# Patient Record
Sex: FEMALE | Race: WHITE | Employment: FULL TIME | URBAN - METROPOLITAN AREA
[De-identification: names, ages, dates, MRNs, and addresses within clinical notes are randomized per-mention and may not be internally consistent; named-entity substitution may affect disease eponyms.]

---

## 2017-01-12 ENCOUNTER — TELEPHONE (OUTPATIENT)
Dept: INTERNAL MEDICINE | Facility: CLINIC | Age: 52
End: 2017-01-12

## 2017-01-12 DIAGNOSIS — R05.9 COUGH: Primary | ICD-10-CM

## 2017-01-12 RX ORDER — AZITHROMYCIN 250 MG/1
TABLET, FILM COATED ORAL
Qty: 6 TABLET | Refills: 0 | Status: SHIPPED | OUTPATIENT
Start: 2017-01-12 | End: 2017-01-13

## 2017-01-12 NOTE — TELEPHONE ENCOUNTER
Pt calls, reports hx of sepsis r/t pneumonia recently. Started having cough a couple days ago that is constant and causes some gagging. Denies productive cough and fever. Reports mild wheezing and hearing rattling when she lays down.    Please advise, thanks.

## 2017-01-12 NOTE — TELEPHONE ENCOUNTER
I can see her anytime tomorrow.(I plan on being here tomorrow)    I will also send in a prescription for possible community acquired pneumonia if the symptoms feel the same as previous.

## 2017-01-13 ENCOUNTER — OFFICE VISIT (OUTPATIENT)
Dept: INTERNAL MEDICINE | Facility: CLINIC | Age: 52
End: 2017-01-13
Payer: COMMERCIAL

## 2017-01-13 ENCOUNTER — RADIANT APPOINTMENT (OUTPATIENT)
Dept: GENERAL RADIOLOGY | Facility: CLINIC | Age: 52
End: 2017-01-13
Attending: INTERNAL MEDICINE
Payer: COMMERCIAL

## 2017-01-13 VITALS
HEIGHT: 64 IN | OXYGEN SATURATION: 98 % | HEART RATE: 68 BPM | TEMPERATURE: 100.6 F | WEIGHT: 194 LBS | DIASTOLIC BLOOD PRESSURE: 82 MMHG | BODY MASS INDEX: 33.12 KG/M2 | SYSTOLIC BLOOD PRESSURE: 126 MMHG

## 2017-01-13 DIAGNOSIS — J45.20 MILD INTERMITTENT ASTHMA WITHOUT COMPLICATION: ICD-10-CM

## 2017-01-13 DIAGNOSIS — J20.8 ACUTE BRONCHITIS DUE TO OTHER SPECIFIED ORGANISMS: Primary | ICD-10-CM

## 2017-01-13 DIAGNOSIS — C19 COLORECTAL CARCINOMA (H): ICD-10-CM

## 2017-01-13 DIAGNOSIS — J20.8 ACUTE BRONCHITIS DUE TO OTHER SPECIFIED ORGANISMS: ICD-10-CM

## 2017-01-13 PROCEDURE — 99214 OFFICE O/P EST MOD 30 MIN: CPT | Performed by: INTERNAL MEDICINE

## 2017-01-13 PROCEDURE — 71020 XR CHEST 2 VW: CPT

## 2017-01-13 RX ORDER — LEVOFLOXACIN 500 MG/1
500 TABLET, FILM COATED ORAL DAILY
Qty: 7 TABLET | Refills: 0 | Status: SHIPPED | OUTPATIENT
Start: 2017-01-13 | End: 2018-05-24

## 2017-01-13 NOTE — MR AVS SNAPSHOT
After Visit Summary   1/13/2017    Liss Berg    MRN: [id number]           Patient Information     Date Of Birth          1965        Visit Information        Provider Department      1/13/2017 3:00 PM Alina Mahmood MD [facility]        Today's Diagnoses     Acute bronchitis due to other specified organisms    -  1     Mild intermittent asthma without complication         Colorectal carcinoma (H)            Follow-ups after your visit        Your next 10 appointments already scheduled     Mar 10, 2017 10:00 AM   CT CHEST/ABDOMEN/PELVIS W CONTRAST with [id number]   [facility] ([facility])    [address]  [facility] [id number] [phone]           Please bring any scans or X-rays taken at other hospitals, if similar tests were done. Also bring a list of your medicines, including vitamins, minerals and over-the-counter drugs. It is safest to leave personal items at home.  Be sure to tell your doctor:   If you have any allergies.   If there s any chance you are pregnant.   If you are breastfeeding.   If you have any special needs.  You may have contrast for this exam. To prepare:   Do not eat or drink for 2 hours before your exam. If you need to take medicine, you may take it with small sips of water. (We may ask you to take liquid medicine as well.)   The day before your exam, drink extra fluids at least six 8-ounce glasses (unless your doctor tells you to restrict your fluids).  Patients over 70 or patients with diabetes or kidney problems:   If you haven t had a blood test (creatinine test) within the last 30 days, go to your clinic or Diagnostic Imaging Department for this test.  If you have diabetes:   If your kidney function is normal, continue taking your metformin (Avandamet, Glucophage, Glucovance, Metaglip) on the day of your exam.   If your kidney function is abnormal, wait 48 hours before  "restarting this medicine.  You will have oral contrast for this exam:   You will drink the contrast at home. Get this from your clinic or Diagnostic Imaging Department. Please follow the directions given.  Please wear loose clothing, such as a sweat suit or jogging clothes. Avoid snaps, zippers and other metal. We may ask you to undress and put on a hospital gown.  If you have any questions, please call the Imaging Department where you will have your exam.              Future tests that were ordered for you today     Open Future Orders        Priority Expected Expires Ordered    XR Chest 2 Views Routine 1/13/2017 1/13/2018 1/13/2017            Who to contact     If you have questions or need follow up information about today's clinic visit or your schedule please contact [facility] directly at [phone].  Normal or non-critical lab and imaging results will be communicated to you by [email or web address], letter or phone within 4 business days after the clinic has received the results. If you do not hear from us within 7 days, please contact the clinic through Jobs2Webhart or phone. If you have a critical or abnormal lab result, we will notify you by phone as soon as possible.  Submit refill requests through Flatora or call your pharmacy and they will forward the refill request to us. Please allow 3 business days for your refill to be completed.          Additional Information About Your Visit        Flatora Information     Flatora lets you send messages to your doctor, view your test results, renew your prescriptions, schedule appointments and more. To sign up, go to [email or web address] . Click on \"Log in\" on the left side of the screen, which will take you to the Welcome page. Then click on \"Sign up Now\" on the right side of the page.     You will be asked to enter the access code listed below, as well as some personal information. Please follow the directions to create your username and password.     Your " "access code is: [email or web address]  Expires: 2017  3:56 PM     Your access code will  in 90 days. If you need help or a new code, please call your [facility] or [phone].        Care EveryWhere ID     This is your Care EveryWhere ID. This could be used by other organizations to access your [place] medical records  [id number]        Your Vitals Were     Pulse Temperature Height BMI (Body Mass Index) Pulse Oximetry       68 100.6  F (38.1  C) (Oral) 5' 4\" (1.626 m) 33.28 kg/m2 98%        Blood Pressure from Last 3 Encounters:   17 126/82   16 170/85   07/15/16 162/76    Weight from Last 3 Encounters:   17 194 lb (87.998 kg)   16 205 lb (92.987 kg)   07/15/16 208 lb 12.8 oz (94.711 kg)                 Today's Medication Changes          These changes are accurate as of: 17  3:56 PM.  If you have any questions, ask your nurse or doctor.               Start taking these medicines.        Dose/Directions    levofloxacin 500 MG tablet   Commonly known as:  LEVAQUIN   Used for:  Acute bronchitis due to other specified organisms   Started by:  Alina Mahmood MD        Dose:  500 mg   Take 1 tablet (500 mg) by mouth daily   Quantity:  7 tablet   Refills:  0            Where to get your medicines      These medications were sent to [id number] - [address] [place] [address] [address] [id number]     Phone:  [phone]    - levofloxacin 500 MG tablet             Primary Care Provider Office Phone # Fax #    Alina Mahmood [phone]       [facility] [address] [facility] [address]        Thank you!     Thank you for choosing [facility]  for your care. Our goal is always to provide you with excellent care. Hearing back from our patients is one way we can continue to improve our services. Please take a few minutes to complete the written survey that you may receive in the mail " after your visit with us. Thank you!             Your Updated Medication List - Protect others around you: Learn how to safely use, store and throw away your medicines at www.disposemymeds.org.          This list is accurate as of: 1/13/17  3:56 PM.  Always use your most recent med list.                   Brand Name Dispense Instructions for use    albuterol 108 (90 BASE) MCG/ACT Inhaler    PROAIR HFA/PROVENTIL HFA/VENTOLIN HFA    1 Inhaler    Inhale 2 puffs into the lungs every 6 hours as needed for shortness of breath / dyspnea or wheezing       ferrous sulfate 325 (65 FE) MG tablet    IRON    90 tablet    Take 1 tablet (325 mg) by mouth daily (with breakfast)       levofloxacin 500 MG tablet    LEVAQUIN    7 tablet    Take 1 tablet (500 mg) by mouth daily       losartan 50 MG tablet    COZAAR    90 tablet    Take 1 tablet (50 mg) by mouth daily       metoprolol 100 MG tablet    LOPRESSOR    180 tablet    Take 1 tablet (100 mg) by mouth 2 times daily       traZODone 50 MG tablet    DESYREL    30 tablet    Take 0.5-2 tablets ( mg) by mouth nightly as needed for sleep

## 2017-01-13 NOTE — NURSING NOTE
"Chief Complaint   Patient presents with     Cough     pt states her port has been causing her a lot of pain onset x 4 days. pt is now febrile. cough is non preod onset x 3 days.     initial /82 mmHg  Pulse 68  Temp(Src) 100.6  F (38.1  C) (Oral)  Ht 5' 4\" (1.626 m)  Wt 194 lb (87.998 kg)  BMI 33.28 kg/m2  SpO2 98% Estimated body mass index is 33.28 kg/(m^2) as calculated from the following:    Height as of this encounter: 5' 4\" (1.626 m).    Weight as of this encounter: 194 lb (87.998 kg)..  bp completed using cuff size large  CRIS ATWOOD LPN  "

## 2017-01-13 NOTE — PROGRESS NOTES
".  SUBJECTIVE:                                                    Liss Berg is a 52 year old female who presents to clinic today for the following health issues:    She was feeling her usual self until 4-5 days ago.  She reports the cough started to return.  She is not coughing up any sputum.   She hears a whistle when she is breathing.  She has had some shortness of breath.   No sore throat, rhinorrhea, or sore throat.    No leg swelling.  No chest pain.     She was discharged 12/20 for pneumonia and sepsis. She had been on azithromycin and ceftin.    History of asthma.     Colon cancer.  Last chemo treatment was last month.   She will have a CT in March.  She sees NP in February.      CT scan of the Chest from OSH revealed:   IMPRESSION:   1. No evidence of pulmonary embolus.  2. Masslike consolidation in the left perihilar region measuring up to 3.7 cm. Differential diagnosis includes infection; however, given history, neoplasm is also a consideration. A smaller adjacent area of consolidation is also present. Pulmonary consultation recommended. At minimum, a short interval follow-up chest CT is recommended.       Problem list and histories reviewed & adjusted, as indicated.      ROS:  C: NEGATIVE for fever, chills, change in weight  E/M: NEGATIVE for ear, mouth and throat problems  R: POS cough and SOB  CV: NEGATIVE for chest pain, palpitations or peripheral edema    OBJECTIVE:                                                    /82 mmHg  Pulse 68  Temp(Src) 100.6  F (38.1  C) (Oral)  Ht 5' 4\" (1.626 m)  Wt 194 lb (87.998 kg)  BMI 33.28 kg/m2  SpO2 98%  Body mass index is 33.28 kg/(m^2).  GENERAL: healthy, alert and no distress  RESP: lungs clear to auscultation - no rales, rhonchi or wheezes  CV: regular rate and rhythm; 2/6 LUCAS     ASSESSMENT/PLAN:                                                        (J20.8) Acute bronchitis due to other specified organisms  (primary encounter " diagnosis)  Comment:   Plan: levofloxacin (LEVAQUIN) 500 MG tablet, XR Chest        2 Views            (J45.20) Mild intermittent asthma without complication  Comment:  Plan: XR Chest 2 Views           (C19) Colorectal carcinoma (H)  Comment:  Plan: follow up with oncology  Pt to also discuss obtaining a repeat CT scan of the chest mass        Alina Mahmood MD  [facility]

## 2017-01-13 NOTE — Clinical Note
[facility] [address] [place] [address]  [phone]      To whom it may concern:    Liss Berg is a patient under my care.  She was seen on 1/13/17 for a medical illness.  Please excuse her from work on 1/13/17- 1/15/17.  She may return to work on 1/16/17.  Please call with any questions.        Alina Mahmood MD

## 2017-02-03 ENCOUNTER — TRANSFERRED RECORDS (OUTPATIENT)
Dept: HEALTH INFORMATION MANAGEMENT | Facility: CLINIC | Age: 52
End: 2017-02-03

## 2017-03-10 ENCOUNTER — HOSPITAL ENCOUNTER (OUTPATIENT)
Dept: CT IMAGING | Facility: CLINIC | Age: 52
Discharge: HOME | End: 2017-03-10
Attending: NURSE PRACTITIONER | Admitting: NURSE PRACTITIONER
Payer: COMMERCIAL

## 2017-03-10 DIAGNOSIS — C18.4 MALIGNANT NEOPLASM OF TRANSVERSE COLON (H): ICD-10-CM

## 2017-03-10 PROCEDURE — 71260 CT THORAX DX C+: CPT

## 2017-03-10 PROCEDURE — 74177 CT ABD & PELVIS W/CONTRAST: CPT

## 2017-03-10 PROCEDURE — 25000128 H RX IP 250 OP 636: Performed by: RADIOLOGY

## 2017-03-10 PROCEDURE — 25500064 ZZH RX 255 OP 636: Performed by: RADIOLOGY

## 2017-03-10 RX ORDER — IOPAMIDOL 755 MG/ML
500 INJECTION, SOLUTION INTRAVASCULAR ONCE
Status: COMPLETED | OUTPATIENT
Start: 2017-03-10 | End: 2017-03-10

## 2017-03-10 RX ADMIN — IOPAMIDOL 95 ML: 755 INJECTION, SOLUTION INTRAVENOUS at 10:03

## 2017-03-10 RX ADMIN — SODIUM CHLORIDE 65 ML: 9 INJECTION, SOLUTION INTRAVENOUS at 10:03

## 2017-03-17 ENCOUNTER — TRANSFERRED RECORDS (OUTPATIENT)
Dept: SURGERY | Facility: CLINIC | Age: 52
End: 2017-03-17

## 2017-04-13 ENCOUNTER — TRANSFERRED RECORDS (OUTPATIENT)
Dept: HEALTH INFORMATION MANAGEMENT | Facility: CLINIC | Age: 52
End: 2017-04-13

## 2017-06-16 ENCOUNTER — TRANSFERRED RECORDS (OUTPATIENT)
Dept: SURGERY | Facility: CLINIC | Age: 52
End: 2017-06-16

## 2017-06-16 ENCOUNTER — TRANSFERRED RECORDS (OUTPATIENT)
Dept: HEALTH INFORMATION MANAGEMENT | Facility: CLINIC | Age: 52
End: 2017-06-16

## 2017-06-17 ENCOUNTER — HEALTH MAINTENANCE LETTER (OUTPATIENT)
Age: 52
End: 2017-06-17

## 2017-06-21 ENCOUNTER — TELEPHONE (OUTPATIENT)
Dept: INTERNAL MEDICINE | Facility: CLINIC | Age: 52
End: 2017-06-21

## 2017-06-21 NOTE — TELEPHONE ENCOUNTER
Panel Management Review      Patient has the following on her problem list:     Asthma review   No flowsheet data found.   1. Is Asthma diagnosis on the Problem List? Yes    2. Is Asthma listed on Health Maintenance? No   3. Patient is due for:  none    Hypertension   Last three blood pressure readings:  BP Readings from Last 3 Encounters:   01/13/17 126/82   08/05/16 170/85   07/15/16 162/76     Blood pressure: Passed    HTN Guidelines:  Age 18-59 BP range:  Less than 140/90  Age 60-85 with Diabetes:  Less than 140/90  Age 60-85 without Diabetes:  less than 150/90      Composite cancer screening  Chart review shows that this patient is due/due soon for the following Pap Smear and Mammogram  Summary:    Patient is due/failing the following:   MAMMOGRAM and PAP    Action needed:   Patient needs office visit for Pap. and Patient needs referral/order: Mammgram    Type of outreach:    Sent letter.    Questions for provider review:    None                                                                                                                                    DAYNA ROJAS CMA       Chart routed to none .

## 2017-07-20 ENCOUNTER — TELEPHONE (OUTPATIENT)
Dept: INTERNAL MEDICINE | Facility: CLINIC | Age: 52
End: 2017-07-20

## 2017-07-20 NOTE — TELEPHONE ENCOUNTER
Pt called. Stated she has 2 issues going on and not sure if they are related. Stated about 15-20min ago she developed blurriness in her L eye, and its been constant. No eye pain, no drainage, no injury. Pt has not checked BP, but can usually tell when BP is high, and pt does not feel it is.     Also pt has not had a period for over 2yrs, and for the past 4wks she has been spotting and having abdominal cramping off and on.       Relayed to pt that she should be seen today for eye issue, and when this is looked at pt should come in for appt regarding vag bleeding. Pt understood. Transferred to scheduling

## 2017-08-01 ENCOUNTER — TELEPHONE (OUTPATIENT)
Dept: INTERNAL MEDICINE | Facility: CLINIC | Age: 52
End: 2017-08-01

## 2017-08-01 NOTE — TELEPHONE ENCOUNTER
8/1/2017    Call Regarding Preventive Health Screening Mammogram    Attempt 1    Message     Comments: vm is not set up yet      Outreach   Seema Martinez

## 2017-08-14 DIAGNOSIS — I10 BENIGN ESSENTIAL HYPERTENSION: ICD-10-CM

## 2017-08-14 RX ORDER — LOSARTAN POTASSIUM 50 MG/1
50 TABLET ORAL DAILY
Qty: 90 TABLET | Refills: 0 | Status: SHIPPED | OUTPATIENT
Start: 2017-08-14 | End: 2018-05-24

## 2017-08-14 NOTE — TELEPHONE ENCOUNTER
Received refill request for:  Cozaar  Last OV was: 8/5/2016 with NATHANAEL Portillo  Labs/EKG: last BMP 8/22/2016  F/U scheduled: overdue orders in Epic (refill letter sent)  New script sent to: [facility]

## 2017-08-14 NOTE — LETTER
August 14, 2017       TO: Liss Berg  [id number]  [facility] [address]       Dear Liss Berg,    You have requested a medication refill for Cozaar and our records indicate that you have not scheduled your annual office visit.  We will refill your medication for 3 months, which will allow you enough time to be seen by one of our providers.    Please call our clinic at [phone] to schedule your appointment as soon as possible.    Thank you,    [facility]

## 2017-09-08 ENCOUNTER — TRANSFERRED RECORDS (OUTPATIENT)
Dept: SURGERY | Facility: CLINIC | Age: 52
End: 2017-09-08

## 2017-09-08 ENCOUNTER — TRANSFERRED RECORDS (OUTPATIENT)
Dept: HEALTH INFORMATION MANAGEMENT | Facility: CLINIC | Age: 52
End: 2017-09-08

## 2017-09-25 DIAGNOSIS — I10 BENIGN ESSENTIAL HYPERTENSION: ICD-10-CM

## 2017-09-25 RX ORDER — METOPROLOL TARTRATE 100 MG
100 TABLET ORAL 2 TIMES DAILY
Qty: 180 TABLET | Refills: 0 | Status: SHIPPED | OUTPATIENT
Start: 2017-09-25 | End: 2018-05-24

## 2017-09-25 NOTE — LETTER
September 25, 2017       TO: Liss Berg  [address]  [facility] [address]       Dear Liss eBrg,    You have requested a medication refill for Metoprolol Tartrate and our records indicate that you have not scheduled your annual office visit with Dr. Leyva.  We will refill your medication for 3 months, which will allow you enough time to be seen by one of our providers.    Please call our clinic at [phone] to schedule your appointment as soon as possible.    Thank you,    [facility]

## 2017-09-25 NOTE — TELEPHONE ENCOUNTER
Received refill request for:  Metoprolol Tartrate  Last OV was: 8/5/2016 with Sophiea  Labs/EKG: n/a  F/U scheduled: orders in [facility] for 9/2017 - Not yet scheduled - refill letter sent  New script sent to: [facility]

## 2017-12-08 ENCOUNTER — HOSPITAL ENCOUNTER (OUTPATIENT)
Dept: CT IMAGING | Facility: CLINIC | Age: 52
Discharge: HOME | End: 2017-12-08
Attending: NURSE PRACTITIONER | Admitting: NURSE PRACTITIONER
Payer: COMMERCIAL

## 2017-12-08 DIAGNOSIS — C18.4 MALIGNANT NEOPLASM OF TRANSVERSE COLON (H): ICD-10-CM

## 2017-12-08 PROCEDURE — 25000128 H RX IP 250 OP 636: Performed by: NURSE PRACTITIONER

## 2017-12-08 PROCEDURE — 25000128 H RX IP 250 OP 636

## 2017-12-08 PROCEDURE — 71260 CT THORAX DX C+: CPT

## 2017-12-08 PROCEDURE — 74177 CT ABD & PELVIS W/CONTRAST: CPT

## 2017-12-08 RX ORDER — HEPARIN SODIUM (PORCINE) LOCK FLUSH IV SOLN 100 UNIT/ML 100 UNIT/ML
5 SOLUTION INTRAVENOUS ONCE
Status: COMPLETED | OUTPATIENT
Start: 2017-12-08 | End: 2017-12-08

## 2017-12-08 RX ORDER — IOPAMIDOL 755 MG/ML
500 INJECTION, SOLUTION INTRAVASCULAR ONCE
Status: COMPLETED | OUTPATIENT
Start: 2017-12-08 | End: 2017-12-08

## 2017-12-08 RX ORDER — HEPARIN SODIUM (PORCINE) LOCK FLUSH IV SOLN 100 UNIT/ML 100 UNIT/ML
SOLUTION INTRAVENOUS
Status: COMPLETED
Start: 2017-12-08 | End: 2017-12-08

## 2017-12-08 RX ADMIN — SODIUM CHLORIDE 65 ML: 9 INJECTION, SOLUTION INTRAVENOUS at 14:30

## 2017-12-08 RX ADMIN — HEPARIN SODIUM (PORCINE) LOCK FLUSH IV SOLN 100 UNIT/ML 5 ML: 100 SOLUTION at 14:47

## 2017-12-08 RX ADMIN — IOPAMIDOL 100 ML: 755 INJECTION, SOLUTION INTRAVENOUS at 14:30

## 2017-12-08 RX ADMIN — SODIUM CHLORIDE, PRESERVATIVE FREE 5 ML: 5 INJECTION INTRAVENOUS at 14:47

## 2017-12-19 ENCOUNTER — TRANSFERRED RECORDS (OUTPATIENT)
Dept: SURGERY | Facility: CLINIC | Age: 52
End: 2017-12-19

## 2017-12-19 ENCOUNTER — TRANSFERRED RECORDS (OUTPATIENT)
Dept: HEALTH INFORMATION MANAGEMENT | Facility: CLINIC | Age: 52
End: 2017-12-19

## 2018-03-19 ENCOUNTER — TRANSFERRED RECORDS (OUTPATIENT)
Dept: SURGERY | Facility: CLINIC | Age: 53
End: 2018-03-19

## 2018-03-19 ENCOUNTER — TRANSFERRED RECORDS (OUTPATIENT)
Dept: HEALTH INFORMATION MANAGEMENT | Facility: CLINIC | Age: 53
End: 2018-03-19

## 2018-05-24 ENCOUNTER — OFFICE VISIT (OUTPATIENT)
Dept: INTERNAL MEDICINE | Facility: CLINIC | Age: 53
End: 2018-05-24
Payer: COMMERCIAL

## 2018-05-24 VITALS
WEIGHT: 234.4 LBS | HEART RATE: 67 BPM | BODY MASS INDEX: 40.02 KG/M2 | TEMPERATURE: 98.4 F | OXYGEN SATURATION: 97 % | DIASTOLIC BLOOD PRESSURE: 76 MMHG | RESPIRATION RATE: 16 BRPM | HEIGHT: 64 IN | SYSTOLIC BLOOD PRESSURE: 138 MMHG

## 2018-05-24 DIAGNOSIS — M25.512 CHRONIC LEFT SHOULDER PAIN: ICD-10-CM

## 2018-05-24 DIAGNOSIS — G89.29 CHRONIC LEFT SHOULDER PAIN: ICD-10-CM

## 2018-05-24 DIAGNOSIS — G47.00 PERSISTENT DISORDER OF INITIATING OR MAINTAINING SLEEP: ICD-10-CM

## 2018-05-24 DIAGNOSIS — I10 BENIGN ESSENTIAL HYPERTENSION: Primary | ICD-10-CM

## 2018-05-24 LAB
ERYTHROCYTE [DISTWIDTH] IN BLOOD BY AUTOMATED COUNT: 13.3 % (ref 10–15)
HCT VFR BLD AUTO: 37.2 % (ref 35–47)
HGB BLD-MCNC: 12.7 G/DL (ref 11.7–15.7)
MCH RBC QN AUTO: 29.9 PG (ref 26.5–33)
MCHC RBC AUTO-ENTMCNC: 34.1 G/DL (ref 31.5–36.5)
MCV RBC AUTO: 88 FL (ref 78–100)
PLATELET # BLD AUTO: 212 10E9/L (ref 150–450)
RBC # BLD AUTO: 4.25 10E12/L (ref 3.8–5.2)
WBC # BLD AUTO: 6.9 10E9/L (ref 4–11)

## 2018-05-24 PROCEDURE — 85027 COMPLETE CBC AUTOMATED: CPT | Performed by: NURSE PRACTITIONER

## 2018-05-24 PROCEDURE — 99214 OFFICE O/P EST MOD 30 MIN: CPT | Performed by: NURSE PRACTITIONER

## 2018-05-24 PROCEDURE — 36415 COLL VENOUS BLD VENIPUNCTURE: CPT | Performed by: NURSE PRACTITIONER

## 2018-05-24 PROCEDURE — 80048 BASIC METABOLIC PNL TOTAL CA: CPT | Performed by: NURSE PRACTITIONER

## 2018-05-24 RX ORDER — LOSARTAN POTASSIUM 50 MG/1
50 TABLET ORAL DAILY
Qty: 90 TABLET | Refills: 1 | Status: SHIPPED | OUTPATIENT
Start: 2018-05-24 | End: 2019-03-07

## 2018-05-24 RX ORDER — METOPROLOL TARTRATE 100 MG
100 TABLET ORAL 2 TIMES DAILY
Qty: 180 TABLET | Refills: 1 | Status: SHIPPED | OUTPATIENT
Start: 2018-05-24 | End: 2019-03-07

## 2018-05-24 RX ORDER — TRAZODONE HYDROCHLORIDE 50 MG/1
25-100 TABLET, FILM COATED ORAL
Qty: 90 TABLET | Refills: 1 | Status: SHIPPED | OUTPATIENT
Start: 2018-05-24 | End: 2021-11-29

## 2018-05-24 NOTE — PROGRESS NOTES
SUBJECTIVE:   Liss Berg is a 53 year old female who presents to clinic today for the following health issues:    Hypertension Follow-up      Outpatient blood pressures are being checked at work.  Results are 130/70.    Low Salt Diet: no added salt      Amount of exercise or physical activity: 4-5 days/week for an average of 15-30 minutes    Problems taking medications regularly: No    Medication side effects: none    Diet: regular (no restrictions)        Musculoskeletal problem/pain      Duration: months    Description  Location: L shoulder pain with over head reaching.  Limited ROM    Intensity:  moderate    Accompanying signs and symptoms: none    History  Previous similar problem: no   Previous evaluation:  none    Precipitating or alleviating factors:  Trauma or overuse: no   Aggravating factors include: raising arm    Therapies tried and outcome: nothing        Patient Active Problem List   Diagnosis     Cellulitis of leg     Cellulitis     Abdominal pain     Colorectal carcinoma (H)     Benign essential hypertension     Mild intermittent asthma without complication     Chronic left shoulder pain     Past Surgical History:   Procedure Laterality Date     COLECTOMY RIGHT Right 5/11/2016    Procedure: COLECTOMY RIGHT;  Surgeon: Miguel Angel Yost MD;  Location:  OR     INSERT PORT VASCULAR ACCESS Left 7/5/2016    Procedure: INSERT PORT VASCULAR ACCESS;  Surgeon: Juan Carlos Lopez MD;  Location:  OR     LAPAROSCOPIC ASSISTED COLECTOMY Right 5/11/2016    Procedure: LAPAROSCOPIC ASSISTED COLECTOMY;  Surgeon: Miguel Angel Yost MD;  Location:  OR       Social History   Substance Use Topics     Smoking status: Never Smoker     Smokeless tobacco: Never Used     Alcohol use 0.0 oz/week     0 Standard drinks or equivalent per week      Comment: less that once per month - very rare     Family History   Problem Relation Age of Onset     Bronchitis Father          Current Outpatient Prescriptions  "  Medication Sig Dispense Refill     albuterol (PROAIR HFA, PROVENTIL HFA, VENTOLIN HFA) 108 (90 BASE) MCG/ACT inhaler Inhale 2 puffs into the lungs every 6 hours as needed for shortness of breath / dyspnea or wheezing 1 Inhaler 1     ferrous sulfate (IRON) 325 (65 FE) MG tablet Take 1 tablet (325 mg) by mouth daily (with breakfast) 90 tablet 0     losartan (COZAAR) 50 MG tablet Take 1 tablet (50 mg) by mouth daily 90 tablet 1     metoprolol tartrate (LOPRESSOR) 100 MG tablet Take 1 tablet (100 mg) by mouth 2 times daily 180 tablet 1     traZODone (DESYREL) 50 MG tablet Take 0.5-2 tablets ( mg) by mouth nightly as needed for sleep 90 tablet 1     [DISCONTINUED] losartan (COZAAR) 50 MG tablet Take 1 tablet (50 mg) by mouth daily 90 tablet 0     [DISCONTINUED] metoprolol (LOPRESSOR) 100 MG tablet Take 1 tablet (100 mg) by mouth 2 times daily 180 tablet 0     [DISCONTINUED] traZODone (DESYREL) 50 MG tablet Take 0.5-2 tablets ( mg) by mouth nightly as needed for sleep 30 tablet 1     BP Readings from Last 3 Encounters:   05/24/18 138/76   01/13/17 126/82   08/05/16 170/85    Wt Readings from Last 3 Encounters:   05/24/18 234 lb 6.4 oz (106.3 kg)   01/13/17 194 lb (88 kg)   08/05/16 205 lb (93 kg)                    Reviewed and updated as needed this visit by clinical staff  Tobacco  Allergies  Med Hx  Surg Hx  Fam Hx  Soc Hx      Reviewed and updated as needed this visit by Provider         ROS:  Constitutional, HEENT, cardiovascular, pulmonary, gi and gu systems are negative, except as otherwise noted.    OBJECTIVE:     /76 (BP Location: Right arm, Patient Position: Sitting, Cuff Size: Adult Large)  Pulse 67  Temp 98.4  F (36.9  C) (Oral)  Resp 16  Ht 5' 4\" (1.626 m)  Wt 234 lb 6.4 oz (106.3 kg)  LMP 04/05/2016  SpO2 97%  BMI 40.23 kg/m2  Body mass index is 40.23 kg/(m^2).  GENERAL: alert, no distress and obese  RESP: lungs clear to auscultation - no rales, rhonchi or wheezes  CV: " regular rate and rhythm, normal S1 S2, no S3 or S4, no murmur, click or rub, no peripheral edema and peripheral pulses strong  MS: L shoulder abduction and extension and internal rotation limited to <90 degrees        ASSESSMENT/PLAN:               ICD-10-CM    1. Benign essential hypertension I10 metoprolol tartrate (LOPRESSOR) 100 MG tablet     losartan (COZAAR) 50 MG tablet     CBC with platelets     Basic metabolic panel   2. Persistent disorder of initiating or maintaining sleep G47.00 traZODone (DESYREL) 50 MG tablet   3. Chronic left shoulder pain M25.512 ORTHO  REFERRAL    G89.29            Virginia Yi, DESIREE  [facility]

## 2018-05-24 NOTE — LETTER
May 29, 2018      Liss Berg  [address]  [facility] [address]        Dear ,    We are writing to inform you of your test results.    Your test results fall within the expected range(s) or remain unchanged from previous results.      Resulted Orders   CBC with platelets   Result Value Ref Range    WBC 6.9 4.0 - 11.0 10e9/L    RBC Count 4.25 3.8 - 5.2 10e12/L    Hemoglobin 12.7 11.7 - 15.7 g/dL    Hematocrit 37.2 35.0 - 47.0 %    MCV 88 78 - 100 fl    MCH 29.9 26.5 - 33.0 pg    MCHC 34.1 31.5 - 36.5 g/dL    RDW 13.3 10.0 - 15.0 %    Platelet Count 212 150 - 450 10e9/L   Basic metabolic panel   Result Value Ref Range    Sodium 143 133 - 144 mmol/L    Potassium 4.0 3.4 - 5.3 mmol/L    Chloride 108 94 - 109 mmol/L    Carbon Dioxide 30 20 - 32 mmol/L    Anion Gap 5 3 - 14 mmol/L    Glucose 93 70 - 99 mg/dL      Comment:      Non Fasting    Urea Nitrogen 13 7 - 30 mg/dL    Creatinine 0.83 0.52 - 1.04 mg/dL    GFR Estimate 72 >60 mL/min/1.7m2      Comment:      Non  GFR Calc    GFR Estimate If Black 87 >60 mL/min/1.7m2      Comment:       GFR Calc    Calcium 8.6 8.5 - 10.1 mg/dL       If you have any questions or concerns, please call the clinic at the number listed above.       Sincerely,        Virginia Yi NP

## 2018-05-24 NOTE — MR AVS SNAPSHOT
After Visit Summary   5/24/2018    Liss Berg    MRN: [id number]           Patient Information     Date Of Birth          1965        Visit Information        Provider Department      5/24/2018 2:20 PM Virginia Yi NP [facility]        Today's Diagnoses     Benign essential hypertension    -  1    Persistent disorder of initiating or maintaining sleep        Chronic left shoulder pain           Follow-ups after your visit        Additional Services     ORTHO  REFERRAL       [facility] is referring you to the Orthopedic  Services at [facility].       The  Representative will assist you in the coordination of your Orthopedic and Musculoskeletal Care as prescribed by your physician.    The  Representative will call you within 1 business day to help schedule your appointment, or you may contact the  Representative at:    All areas ~ [phone]     Type of Referral : Non Surgical       Timeframe requested: 3 - 5 days    Coverage of these services is subject to the terms and limitations of your health insurance plan.  Please call member services at your health plan with any benefit or coverage questions.      If X-rays, CT or MRI's have been performed, please contact the facility where they were done to arrange for , prior to your scheduled appointment.  Please bring this referral request to your appointment and present it to your specialist.                  Your next 10 appointments already scheduled     Jun 18, 2018  9:30 AM CDT   CT CHEST/ABDOMEN/PELVIS W CONTRAST with [address] [facility] ([facility])    [address]  [facility] [address] [phone]           Please bring any scans or X-rays taken at other hospitals, if similar tests were done. Also bring a list of your medicines, including vitamins,  minerals and over-the-counter drugs. It is safest to leave personal items at home.  Be sure to tell your doctor:   If you have any allergies.   If there s any chance you are pregnant.   If you are breastfeeding.  How to prepare:   Do not eat or drink for 2 hours before your exam. If you need to take medicine, you may take it with small sips of water. (We may ask you to take liquid medicine as well.)   Please wear loose clothing, such as a sweat suit or jogging clothes. Avoid snaps, zippers and other metal. We may ask you to undress and put on a hospital gown.  Please arrive 30 minutes early for your CT. Once in the department you might be asked to drink water 15-20 minutes prior to your exam.  If indicated you may be asked to drink an oral contrast in advance of your CT.  If this is the case, the imaging team will let you know or be in contact with you prior to your appointment  Patients over 70 or patients with diabetes or kidney problems:   If you haven t had a blood test (creatinine test) within the last 30 days, the Cardiologist/Radiologist may require you to get this test prior to your exam.  If you have diabetes:   Continue to take your metformin medication on the day of your exam  If you have any questions, please call the Imaging Department where you will have your exam.              Who to contact     If you have questions or need follow up information about today's clinic visit or your schedule please contact [facility] directly at [phone].  Normal or non-critical lab and imaging results will be communicated to you by MyChart, letter or phone within 4 business days after the clinic has received the results. If you do not hear from us within 7 days, please contact the clinic through MyChart or phone. If you have a critical or abnormal lab result, we will notify you by phone as soon as possible.  Submit refill requests through [employer] or call your pharmacy and they will forward the  "refill request to us. Please allow 3 business days for your refill to be completed.          Additional Information About Your Visit        Care EveryWhere ID     This is your Care EveryWhere ID. This could be used by other organizations to access your [facility] medical records  [id number]        Your Vitals Were     Pulse Temperature Respirations Height Last Period Pulse Oximetry    67 98.4  F (36.9  C) (Oral) 16 5' 4\" (1.626 m) 04/05/2016 97%    BMI (Body Mass Index)                   40.23 kg/m2            Blood Pressure from Last 3 Encounters:   05/24/18 138/76   01/13/17 126/82   08/05/16 170/85    Weight from Last 3 Encounters:   05/24/18 234 lb 6.4 oz (106.3 kg)   01/13/17 194 lb (88 kg)   08/05/16 205 lb (93 kg)              We Performed the Following     Basic metabolic panel     CBC with platelets     ORTHO  REFERRAL          Where to get your medicines      These medications were sent to [id number] - [place], [place] - [address]  [address], [facility] [address]     Phone:  [phone]     losartan 50 MG tablet    metoprolol tartrate 100 MG tablet    traZODone 50 MG tablet          Primary Care Provider Office Phone # Fax #    Alina Mahmood [phone]       [address] [facility] [address]        Equal Access to Services     SAL[facility] HospitalDENNY AH: Hadii bennett ku hadasho Sorubi, waaxda luqadaha, qaybta kaalmada adeegyada, delbert rasmussen . So [facility] [phone].    ATENCIÓN: Si habla español, tiene a costello disposición servicios gratuitos de asistencia lingüística. Lizett al [phone].    We comply with applicable federal civil rights laws and [place] laws. We do not discriminate on the basis of race, color, national origin, age, disability, sex, sexual orientation, or gender identity.            Thank you!     Thank you for choosing [facility]  for your care. Our goal is always to provide you with " excellent care. Hearing back from our patients is one way we can continue to improve our services. Please take a few minutes to complete the written survey that you may receive in the mail after your visit with us. Thank you!             Your Updated Medication List - Protect others around you: Learn how to safely use, store and throw away your medicines at www.disposemymeds.org.          This list is accurate as of 5/24/18  2:33 PM.  Always use your most recent med list.                   Brand Name Dispense Instructions for use Diagnosis    albuterol 108 (90 Base) MCG/ACT Inhaler    PROAIR HFA/PROVENTIL HFA/VENTOLIN HFA    1 Inhaler    Inhale 2 puffs into the lungs every 6 hours as needed for shortness of breath / dyspnea or wheezing    Mild intermittent asthma without complication       ferrous sulfate 325 (65 Fe) MG tablet    IRON    90 tablet    Take 1 tablet (325 mg) by mouth daily (with breakfast)    Iron deficiency anemia, unspecified iron deficiency anemia type       losartan 50 MG tablet    COZAAR    90 tablet    Take 1 tablet (50 mg) by mouth daily    Benign essential hypertension       metoprolol tartrate 100 MG tablet    LOPRESSOR    180 tablet    Take 1 tablet (100 mg) by mouth 2 times daily    Benign essential hypertension       traZODone 50 MG tablet    DESYREL    90 tablet    Take 0.5-2 tablets ( mg) by mouth nightly as needed for sleep    Persistent disorder of initiating or maintaining sleep

## 2018-05-25 LAB
ANION GAP SERPL CALCULATED.3IONS-SCNC: 5 MMOL/L (ref 3–14)
BUN SERPL-MCNC: 13 MG/DL (ref 7–30)
CALCIUM SERPL-MCNC: 8.6 MG/DL (ref 8.5–10.1)
CHLORIDE SERPL-SCNC: 108 MMOL/L (ref 94–109)
CO2 SERPL-SCNC: 30 MMOL/L (ref 20–32)
CREAT SERPL-MCNC: 0.83 MG/DL (ref 0.52–1.04)
GFR SERPL CREATININE-BSD FRML MDRD: 72 ML/MIN/1.7M2
GLUCOSE SERPL-MCNC: 93 MG/DL (ref 70–99)
POTASSIUM SERPL-SCNC: 4 MMOL/L (ref 3.4–5.3)
SODIUM SERPL-SCNC: 143 MMOL/L (ref 133–144)

## 2018-05-30 ENCOUNTER — RADIANT APPOINTMENT (OUTPATIENT)
Dept: GENERAL RADIOLOGY | Facility: CLINIC | Age: 53
End: 2018-05-30
Attending: ORTHOPAEDIC SURGERY
Payer: COMMERCIAL

## 2018-05-30 ENCOUNTER — OFFICE VISIT (OUTPATIENT)
Dept: ORTHOPEDICS | Facility: CLINIC | Age: 53
End: 2018-05-30
Payer: COMMERCIAL

## 2018-05-30 VITALS — WEIGHT: 234 LBS | BODY MASS INDEX: 40.17 KG/M2 | DIASTOLIC BLOOD PRESSURE: 86 MMHG | SYSTOLIC BLOOD PRESSURE: 146 MMHG

## 2018-05-30 DIAGNOSIS — G89.29 CHRONIC LEFT SHOULDER PAIN: ICD-10-CM

## 2018-05-30 DIAGNOSIS — M25.512 CHRONIC LEFT SHOULDER PAIN: ICD-10-CM

## 2018-05-30 DIAGNOSIS — G89.29 CHRONIC LEFT SHOULDER PAIN: Primary | ICD-10-CM

## 2018-05-30 DIAGNOSIS — M25.512 CHRONIC LEFT SHOULDER PAIN: Primary | ICD-10-CM

## 2018-05-30 PROCEDURE — 73030 X-RAY EXAM OF SHOULDER: CPT | Mod: LT

## 2018-05-30 PROCEDURE — 99203 OFFICE O/P NEW LOW 30 MIN: CPT | Performed by: ORTHOPAEDIC SURGERY

## 2018-05-30 NOTE — LETTER
5/30/2018         RE: Liss Berg  [address]  [facility] [address]        Dear Colleague,    Thank you for referring your patient, Liss Berg, to the [facility] ORTHOPED[facility] SURGERY. Please see a copy of my visit note below.    HISTORY OF PRESENT ILLNESS:    Liss Berg is a 53 year old female who is seen in consultation at the request of Dr. Yi for Left shoulder pain. Pain started 6-8 months ago.  Patient stated she thought shoulder pain was coming from port on left side, no other trauma or injury noted.  Patient works as a cook and dietary manager.   Present symptoms: sharp pain located lateral upper arm and posterior aspect of shoulder. decreased ROM above shoulder heigh and extension, decreased strength in left arm >10#.  Treatments tried to this point: Ibuprofen  Orthopedic PMH: none.    Past Medical History:   Diagnosis Date     Asthma      Hypertension        Past Surgical History:   Procedure Laterality Date     COLECTOMY RIGHT Right 5/11/2016    Procedure: COLECTOMY RIGHT;  Surgeon: Miguel Angel Yost MD;  Location: [facility] OR     INSERT PORT VASCULAR ACCESS Left 7/5/2016    Procedure: INSERT PORT VASCULAR ACCESS;  Surgeon: Juan Carlos Lopez MD;  Location: [facility] OR     LAPAROSCOPIC ASSISTED COLECTOMY Right 5/11/2016    Procedure: LAPAROSCOPIC ASSISTED COLECTOMY;  Surgeon: Miguel Angel Yost MD;  Location: [facility] OR       Family History   Problem Relation Age of Onset     Bronchitis Father        Social History     Social History     Marital status:      Spouse name: N/A     Number of children: N/A     Years of education: N/A     Occupational History     Not on file.     Social History Main Topics     Smoking status: Never Smoker     Smokeless tobacco: Never Used     Alcohol use 0.0 oz/week     0 Standard drinks or equivalent per week      Comment: less that once per month - very rare     Drug use: No     Sexual activity: No     Other Topics Concern      Caffeine Concern No     very little - rare     Special Diet Yes     low fiber and smaller portions     Exercise No     walking     Social History Narrative       Current Outpatient Prescriptions   Medication Sig Dispense Refill     albuterol (PROAIR HFA, PROVENTIL HFA, VENTOLIN HFA) 108 (90 BASE) MCG/ACT inhaler Inhale 2 puffs into the lungs every 6 hours as needed for shortness of breath / dyspnea or wheezing 1 Inhaler 1     ferrous sulfate (IRON) 325 (65 FE) MG tablet Take 1 tablet (325 mg) by mouth daily (with breakfast) 90 tablet 0     losartan (COZAAR) 50 MG tablet Take 1 tablet (50 mg) by mouth daily 90 tablet 1     metoprolol tartrate (LOPRESSOR) 100 MG tablet Take 1 tablet (100 mg) by mouth 2 times daily 180 tablet 1     traZODone (DESYREL) 50 MG tablet Take 0.5-2 tablets ( mg) by mouth nightly as needed for sleep 90 tablet 1       Allergies   Allergen Reactions     Penicillins Hives       REVIEW OF SYSTEMS:  CONSTITUTIONAL:  NEGATIVE for fever, chills, change in weight  INTEGUMENTARY/SKIN:  NEGATIVE for worrisome rashes, moles or lesions  EYES:  NEGATIVE for vision changes or irritation  ENT/MOUTH:  NEGATIVE for ear, mouth and throat problems  RESP:  NEGATIVE for significant cough or SOB  BREAST:  NEGATIVE for masses, tenderness or discharge  CV:  NEGATIVE for chest pain, palpitations or peripheral edema  GI:  NEGATIVE for nausea, abdominal pain, heartburn, or change in bowel habits  :  Negative   MUSCULOSKELETAL:  See HPI above  NEURO:  NEGATIVE for weakness, dizziness or paresthesias  ENDOCRINE:  NEGATIVE for temperature intolerance, skin/hair changes  HEME/ALLERGY/IMMUNE:  NEGATIVE for bleeding problems  PSYCHIATRIC:  NEGATIVE for changes in mood or affect      PHYSICAL EXAM:  /86 (BP Location: Right arm, Patient Position: Chair, Cuff Size: Adult Large)  Wt 234 lb (106.1 kg)  LMP 04/05/2016  BMI 40.17 kg/m2  Body mass index is 40.17 kg/(m^2).   GENERAL APPEARANCE: healthy, alert and no  distress   SKIN: no suspicious lesions or rashes  NEURO: Normal strength and tone, mentation intact and speech normal  VASCULAR: Good pulses, and capillary refill   LYMPH: no lymphadenopathy   PSYCH:  mentation appears normal and affect normal/bright    MSK:  Examination of the neck and shoulder girdle musculature reveals no asymmetry, or atrophy to the muscle masses.  There is no erythema, ecchymosis or edema.  There is a normal lordosis to the cervical and lumbar spine regions.  There is a normal kyphosis to the thoracic spine.  There is no clinical evidence of scoliosis. There is no tenderness to palpation or percussion.  There is no paraspinal muscle spasm present.  There is a Normal range of motion to the cervical spine. Forward flexion to 45, extension to 55, R and L lateral bending to 45, and rotation to 70, both R and L.    Strength : Bicep 5/5   C5-6       Sensation :     Deltoid 5/5   C5    Lateral Arm    Wrist extensors 5/5  C6    Thumb    Tricep  5/5   C7    Middle Finger    Finger flexors  5/5  C8    Little Finger    Interossei  5/5   T1    Medial Arm    Reflexes :  Bicep   Symmetric  C5-6    BR Symmetric  C6    Tricep Symmetric  C7    Shoulder:  Examination of the left shoulder reveals a partial active ROM and full passive ROM.    Forward Flexion : 180 degrees Pain  YES --   Abduction  :  180 degrees  YES --   Internal Rotation : thoracic 10   YES --  Subscap Lift-off test negative  External Rotation :    0 Deg-90  90 Deg- 90  Contralateral side symmetric    There is no tenderness to palpation about the AC joint, anterior capsule or Bicep tendon.  There is mild tenderness to palpation in the subacromial space.  There is give-way rotator cuff weakness.  Speed's an Yergason's Tests for Bicep pathology are negative. Arm adduction does not cause pain in the AC joint.  Apprehension sign is negative. Scapular winging is not present. ROM of the elbow and wrist is normal and CMS is intact to fingertips.                  ASSESSMENT / PLAN: Possible rotator cuff pathology.  I ordered an MRI to confirm the suspicion.    Patient to follow up with Primary Care in regards to elevated blood pressure.      Imaging Interpretation:         Oneal Taylor MD  Department of Orthopedic Surgery          Again, thank you for allowing me to participate in the care of your patient.        Sincerely,        Jass Taylor MD

## 2018-05-30 NOTE — PATIENT INSTRUCTIONS
Please call  [phone] to schedule your appointment if you have not heard from them in two business days  If you need to cancel or change the appointment please call [phone]   Please follow up in clinic 1-2 business days following the MRI / MRI Arthrogram to discuss results

## 2018-05-30 NOTE — PROGRESS NOTES
HISTORY OF PRESENT ILLNESS:    Liss Berg is a 53 year old female who is seen in consultation at the request of Dr. Yi for Left shoulder pain. Pain started 6-8 months ago.  Patient stated she thought shoulder pain was coming from port on left side, no other trauma or injury noted.  Patient works as a cook and dietary manager.   Present symptoms: sharp pain located lateral upper arm and posterior aspect of shoulder. decreased ROM above shoulder heigh and extension, decreased strength in left arm >10#.  Treatments tried to this point: Ibuprofen  Orthopedic PMH: none.    Past Medical History:   Diagnosis Date     Asthma      Hypertension        Past Surgical History:   Procedure Laterality Date     COLECTOMY RIGHT Right 5/11/2016    Procedure: COLECTOMY RIGHT;  Surgeon: Miguel Angel Yost MD;  Location: [facility] OR     INSERT PORT VASCULAR ACCESS Left 7/5/2016    Procedure: INSERT PORT VASCULAR ACCESS;  Surgeon: Juan Carlos Lopez MD;  Location: [facility] OR     LAPAROSCOPIC ASSISTED COLECTOMY Right 5/11/2016    Procedure: LAPAROSCOPIC ASSISTED COLECTOMY;  Surgeon: Miguel Angel Yost MD;  Location: [facility] OR       Family History   Problem Relation Age of Onset     Bronchitis Father        Social History     Social History     Marital status:      Spouse name: N/A     Number of children: N/A     Years of education: N/A     Occupational History     Not on file.     Social History Main Topics     Smoking status: Never Smoker     Smokeless tobacco: Never Used     Alcohol use 0.0 oz/week     0 Standard drinks or equivalent per week      Comment: less that once per month - very rare     Drug use: No     Sexual activity: No     Other Topics Concern     Caffeine Concern No     very little - rare     Special Diet Yes     low fiber and smaller portions     Exercise No     walking     Social History Narrative       Current Outpatient Prescriptions   Medication Sig Dispense Refill     albuterol (PROAIR HFA, PROVENTIL HFA,  VENTOLIN HFA) 108 (90 BASE) MCG/ACT inhaler Inhale 2 puffs into the lungs every 6 hours as needed for shortness of breath / dyspnea or wheezing 1 Inhaler 1     ferrous sulfate (IRON) 325 (65 FE) MG tablet Take 1 tablet (325 mg) by mouth daily (with breakfast) 90 tablet 0     losartan (COZAAR) 50 MG tablet Take 1 tablet (50 mg) by mouth daily 90 tablet 1     metoprolol tartrate (LOPRESSOR) 100 MG tablet Take 1 tablet (100 mg) by mouth 2 times daily 180 tablet 1     traZODone (DESYREL) 50 MG tablet Take 0.5-2 tablets ( mg) by mouth nightly as needed for sleep 90 tablet 1       Allergies   Allergen Reactions     Penicillins Hives       REVIEW OF SYSTEMS:  CONSTITUTIONAL:  NEGATIVE for fever, chills, change in weight  INTEGUMENTARY/SKIN:  NEGATIVE for worrisome rashes, moles or lesions  EYES:  NEGATIVE for vision changes or irritation  ENT/MOUTH:  NEGATIVE for ear, mouth and throat problems  RESP:  NEGATIVE for significant cough or SOB  BREAST:  NEGATIVE for masses, tenderness or discharge  CV:  NEGATIVE for chest pain, palpitations or peripheral edema  GI:  NEGATIVE for nausea, abdominal pain, heartburn, or change in bowel habits  :  Negative   MUSCULOSKELETAL:  See HPI above  NEURO:  NEGATIVE for weakness, dizziness or paresthesias  ENDOCRINE:  NEGATIVE for temperature intolerance, skin/hair changes  HEME/ALLERGY/IMMUNE:  NEGATIVE for bleeding problems  PSYCHIATRIC:  NEGATIVE for changes in mood or affect      PHYSICAL EXAM:  /86 (BP Location: Right arm, Patient Position: Chair, Cuff Size: Adult Large)  Wt 234 lb (106.1 kg)  LMP 04/05/2016  BMI 40.17 kg/m2  Body mass index is 40.17 kg/(m^2).   GENERAL APPEARANCE: healthy, alert and no distress   SKIN: no suspicious lesions or rashes  NEURO: Normal strength and tone, mentation intact and speech normal  VASCULAR: Good pulses, and capillary refill   LYMPH: no lymphadenopathy   PSYCH:  mentation appears normal and affect normal/bright    MSK:  Examination  of the neck and shoulder girdle musculature reveals no asymmetry, or atrophy to the muscle masses.  There is no erythema, ecchymosis or edema.  There is a normal lordosis to the cervical and lumbar spine regions.  There is a normal kyphosis to the thoracic spine.  There is no clinical evidence of scoliosis. There is no tenderness to palpation or percussion.  There is no paraspinal muscle spasm present.  There is a Normal range of motion to the cervical spine. Forward flexion to 45, extension to 55, R and L lateral bending to 45, and rotation to 70, both R and L.    Strength : Bicep 5/5   C5-6       Sensation :     Deltoid 5/5   C5    Lateral Arm    Wrist extensors 5/5  C6    Thumb    Tricep  5/5   C7    Middle Finger    Finger flexors  5/5  C8    Little Finger    Interossei  5/5   T1    Medial Arm    Reflexes :  Bicep   Symmetric  C5-6    BR Symmetric  C6    Tricep Symmetric  C7    Shoulder:  Examination of the left shoulder reveals a partial active ROM and full passive ROM.    Forward Flexion : 180 degrees Pain  YES --   Abduction  :  180 degrees  YES --   Internal Rotation : thoracic 10   YES --  Subscap Lift-off test negative  External Rotation :    0 Deg-90  90 Deg- 90  Contralateral side symmetric    There is no tenderness to palpation about the AC joint, anterior capsule or Bicep tendon.  There is mild tenderness to palpation in the subacromial space.  There is give-way rotator cuff weakness.  Speed's an Yergason's Tests for Bicep pathology are negative. Arm adduction does not cause pain in the AC joint.  Apprehension sign is negative. Scapular winging is not present. ROM of the elbow and wrist is normal and CMS is intact to fingertips.                 ASSESSMENT / PLAN: Possible rotator cuff pathology.  I ordered an MRI to confirm the suspicion.    Patient to follow up with Primary Care in regards to elevated blood pressure.      Imaging Interpretation:         Oneal Taylor MD  Department of Orthopedic  Surgery

## 2018-05-30 NOTE — MR AVS SNAPSHOT
After Visit Summary   5/30/2018    Liss Berg    MRN: [id number]           Patient Information     Date Of Birth          1965        Visit Information        Provider Department      5/30/2018 2:40 PM Jass Taylor MD [facility] ORTHOPEDIC SURGERY        Today's Diagnoses     Chronic left shoulder pain    -  1      Care Instructions    Please call  [phone] to schedule your appointment if you have not heard from them in two business days  If you need to cancel or change the appointment please call [phone]   Please follow up in clinic 1-2 business days following the MRI / MRI Arthrogram to discuss results              Follow-ups after your visit        Who to contact     If you have questions or need follow up information about today's clinic visit or your schedule please contact [facility] ORTHOPEDIC SURGERY directly at [phone].  Normal or non-critical lab and imaging results will be communicated to you by MyChart, letter or phone within 4 business days after the clinic has received the results. If you do not hear from us within 7 days, please contact the clinic through MyChart or phone. If you have a critical or abnormal lab result, we will notify you by phone as soon as possible.  Submit refill requests through Finjan or call your pharmacy and they will forward the refill request to us. Please allow 3 business days for your refill to be completed.          Additional Information About Your Visit        Care EveryWhere ID     This is your Care EveryWhere ID. This could be used by other organizations to access your [place] medical records  [id number]        Your Vitals Were     Last Period BMI (Body Mass Index)                04/05/2016 40.17 kg/m2           Blood Pressure from Last 3 Encounters:   06/13/18 138/88   05/30/18 146/86   05/24/18 138/76    Weight from Last 3 Encounters:   06/13/18 234 lb (106.1 kg)   05/30/18 234 lb (106.1 kg)   05/24/18  234 lb 6.4 oz (106.3 kg)               Primary Care Provider Office Phone # Fax #    Alina Soren Mahmood [phone]       [address] [facility] [address]        Equal Access to Services     VALERIA MERLOS : Hadii bennett ku haderino Soomaali, waaxda luqadaha, qaybta kaalmada adeegyada, delbert burgosn hermann burgess lafernandoshahida soto. So [facility] [phone].    ATENCIÓN: Si habla español, tiene a costello disposición servicios gratuitos de asistencia lingüística. Llame al [phone].    We comply with applicable federal civil rights laws and [place] laws. We do not discriminate on the basis of race, color, national origin, age, disability, sex, sexual orientation, or gender identity.            Thank you!     Thank you for choosing [facility] ORTHOPEDIC SURGERY  for your care. Our goal is always to provide you with excellent care. Hearing back from our patients is one way we can continue to improve our services. Please take a few minutes to complete the written survey that you may receive in the mail after your visit with us. Thank you!             Your Updated Medication List - Protect others around you: Learn how to safely use, store and throw away your medicines at www.disposemymeds.org.          This list is accurate as of 5/30/18 11:59 PM.  Always use your most recent med list.                   Brand Name Dispense Instructions for use Diagnosis    albuterol 108 (90 Base) MCG/ACT Inhaler    PROAIR HFA/PROVENTIL HFA/VENTOLIN HFA    1 Inhaler    Inhale 2 puffs into the lungs every 6 hours as needed for shortness of breath / dyspnea or wheezing    Mild intermittent asthma without complication       ferrous sulfate 325 (65 Fe) MG tablet    IRON    90 tablet    Take 1 tablet (325 mg) by mouth daily (with breakfast)    Iron deficiency anemia, unspecified iron deficiency anemia type       losartan 50 MG tablet    COZAAR    90 tablet    Take 1 tablet (50 mg) by mouth daily    Benign essential hypertension        metoprolol tartrate 100 MG tablet    LOPRESSOR    180 tablet    Take 1 tablet (100 mg) by mouth 2 times daily    Benign essential hypertension       traZODone 50 MG tablet    DESYREL    90 tablet    Take 0.5-2 tablets ( mg) by mouth nightly as needed for sleep    Persistent disorder of initiating or maintaining sleep

## 2018-06-06 ENCOUNTER — TELEPHONE (OUTPATIENT)
Dept: ORTHOPEDICS | Facility: CLINIC | Age: 53
End: 2018-06-06

## 2018-06-06 NOTE — TELEPHONE ENCOUNTER
Patient called stating that she called scheduling to schedule the MRI ordered for her shoulder and they stated it was pended, order awaiting signature.     Patient would like a call back when the order is signed.      Fina Welch M.Ed., ATR, ATC

## 2018-06-07 ENCOUNTER — HOSPITAL ENCOUNTER (OUTPATIENT)
Dept: MRI IMAGING | Facility: CLINIC | Age: 53
Discharge: HOME | End: 2018-06-07
Attending: ORTHOPAEDIC SURGERY | Admitting: ORTHOPAEDIC SURGERY
Payer: COMMERCIAL

## 2018-06-07 DIAGNOSIS — M25.512 CHRONIC LEFT SHOULDER PAIN: ICD-10-CM

## 2018-06-07 DIAGNOSIS — G89.29 CHRONIC LEFT SHOULDER PAIN: ICD-10-CM

## 2018-06-07 PROCEDURE — 73221 MRI JOINT UPR EXTREM W/O DYE: CPT | Mod: LT

## 2018-06-13 ENCOUNTER — OFFICE VISIT (OUTPATIENT)
Dept: ORTHOPEDICS | Facility: CLINIC | Age: 53
End: 2018-06-13
Payer: COMMERCIAL

## 2018-06-13 VITALS — BODY MASS INDEX: 40.17 KG/M2 | DIASTOLIC BLOOD PRESSURE: 88 MMHG | WEIGHT: 234 LBS | SYSTOLIC BLOOD PRESSURE: 138 MMHG

## 2018-06-13 DIAGNOSIS — M75.42 SUBACROMIAL IMPINGEMENT OF LEFT SHOULDER: Primary | ICD-10-CM

## 2018-06-13 PROCEDURE — 99213 OFFICE O/P EST LOW 20 MIN: CPT | Mod: 25 | Performed by: ORTHOPAEDIC SURGERY

## 2018-06-13 PROCEDURE — 20610 DRAIN/INJ JOINT/BURSA W/O US: CPT | Mod: LT | Performed by: ORTHOPAEDIC SURGERY

## 2018-06-13 RX ORDER — METHYLPREDNISOLONE ACETATE 40 MG/ML
40 INJECTION, SUSPENSION INTRA-ARTICULAR; INTRALESIONAL; INTRAMUSCULAR; SOFT TISSUE ONCE
Qty: 1 ML | Refills: 0 | OUTPATIENT
Start: 2018-06-13 | End: 2018-06-13

## 2018-06-13 RX ORDER — LIDOCAINE HYDROCHLORIDE 10 MG/ML
3 INJECTION, SOLUTION INFILTRATION; PERINEURAL ONCE
Qty: 3 ML | Refills: 0 | OUTPATIENT
Start: 2018-06-13 | End: 2018-06-13

## 2018-06-13 RX ORDER — DEXAMETHASONE SODIUM PHOSPHATE 4 MG/ML
4 INJECTION, SOLUTION INTRA-ARTICULAR; INTRALESIONAL; INTRAMUSCULAR; INTRAVENOUS; SOFT TISSUE ONCE
Qty: 1 ML | Refills: 0 | OUTPATIENT
Start: 2018-06-13 | End: 2021-11-23

## 2018-06-13 NOTE — Clinical Note
"    6/13/2018         RE: Liss Berg  [address]  [facility] [address]        Dear Colleague,    Thank you for referring your patient, Liss Berg, to the [facility] ORTHOPEDIC SURGERY. Please see a copy of my visit note below.    HISTORY OF PRESENT ILLNESS:    Liss Berg is a 53 year old female who is seen in follow up for left shoulder MRI results. Last office visit 5/30/18.  Present symptoms: increased pain in arm, sharp pain located lateral upper arm and posterior aspect of shoulder. decreased ROM above shoulder height with forward flexion, chest height with abduction, and extension.  Decreased strength in left arm >10#. Unable to sleep at night due to increased shoulder pain.     Treatments tried to this point: ibuprofen.    PHYSICAL EXAM:  LMP 04/05/2016  There is no height or weight on file to calculate BMI.   GENERAL APPEARANCE: {NGA GENERAL APPEARANCE:50::\"healthy\",\"alert\",\"no distress\"}   SKIN: {NGA EXAM CPE - SKIN:577064::\"no suspicious lesions or rashes\"}  NEURO: {NGA EXAM CPE - NEURO:787066::\"Normal strength and tone\",\"mentation intact\",\"speech normal\"}  VASCULAR: *** good pulses, and cappillary refill   LYMPH: no lymphadenopathy   PSYCH:  {NGA EXAM CPE - PSYCH:680347::\"mentation appears normal\",\"affect normal/bright\"}    MSK:  Examination of the neck and shoulder girdle musculature reveals no asymmetry, or atrophy to the muscle masses.  There is no erythema, ecchymosis or edema.  There is a normal lordosis to the cervical and lumbar spine regions.  There is a normal kyphosis to the thoracic spine.  There is {NO:800311} clinical evidence of scoliosis. There is no tenderness to palpation or percussion.  There is no paraspinal muscle spasm present.  There is a {NORMAL:653667} range of motion to the cervical spine. Forward flexion to 45, extension to 55, R and L lateral bending to 45, and rotation to 70, both R and L.    Strength : Bicep 5/5   C5-6       " "Sensation :     Deltoid 5/5   C5    Lateral Arm    Wrist extensors 5/5  C6    Thumb    Tricep  5/5   C7    Middle Finger    Finger flexors  5/5  C8    Little Finger    Interossei  5/5   T1    Medial Arm    Reflexes :  Bicep   Symmetric  C5-6    BR Symmetric  C6    Tricep Symmetric  C7    Shoulder:  Examination of the {RIGHT /LEFT:717638} shoulder reveals a {FULL/PARTIAL:450516} active ROM and {FULL/PARTIAL:609918} passive ROM.    Forward Flexion : {DEGREE - MILD, MOD, SEV:863416} Pain  {YES:961513}  Abduction  :  {DEGREE - MILD, MOD, SEV:644427}  {YES:830495}  Internal Rotation : {THORACIC:078328}  {YES:394385} Subscap Lift-off test negative  External Rotation :    0 Deg-***  90 Deg- ***  Contralateral side symmetric    There is no tenderness to palpation about the AC joint, anterior capsule or Bicep tendon.  There is {MILD MOD:308267::\"no\"} tenderness to palpation in the subacromial space.  There {MILD MOD:302060::\"is no\"} rotator cuff weakness.  Speed's an Yergason's Tests for Bicep pathology are negative. Arm adduction does not cause pain in the AC joint.  Apprehension sign is negative. Scapular winging is not present. ROM of the elbow and wrist is normal and CMS is intact to fingertips.        IMAGING INTERPRETATION:     MR LEFT SHOULDER WITHOUT CONTRAST 6/7/2018 6:45 PM     HISTORY: Six-month history of left shoulder pain and stiffness. No  specific injury.     TECHNIQUE: Oblique coronal T1, T2 and T2 fat suppressed images,  oblique sagittal T2 and axial proton density and T2 weighted images  were obtained.      COMPARISON: None.     FINDINGS:  Osseous acromial outlet: Moderate hypertrophic degenerative change at  the acromioclavicular joint abuts but does not indent the  supraspinatus myotendinous junction. Distal acromial morphology is  type one with near neutral slope on oblique sagittal images and no  lateral downsloping oblique coronal images. There is no abnormal fluid  in the subacromial/subdeltoid " bursa.     Rotator cuff: Mild signal heterogeneity lateral supraspinatus tendon  consistent with degenerative tendinopathy. No evidence for partial or  full-thickness tear, tendon retraction or supraspinatus muscle  atrophy. The infraspinatus muscle and tendon, teres minor muscle and  subscapularis muscle and tendon are normal.     Labrum: The glenoid labrum is normal as visualized.     Biceps tendon: Intrasubstance increased signal in the biceps tendon as  it courses over the humeral head consistent with early mucoid  degeneration (images 12 and 13 of series 8). The tendon appears normal  at the level of the bicipital groove.       Osseous structures and cartilaginous surfaces: Mild cystic resorptive  changes in the posterolateral humeral head and greater tuberosity near  the insertion of the rotator cuff. No acute bony abnormality and  glenohumeral articular cartilages are unremarkable.     Additional findings: None.         IMPRESSION:   1. Supraspinatus degenerative tendinopathy. No evidence for rotator  cuff tear.  2. Mucoid degeneration proximal biceps tendon.     DONYA BEAL MD       ASSESSMENT / PLAN:        Oneal Taylor MD  Dept. Orthopedic Surgery  [facility]         Again, thank you for allowing me to participate in the care of your patient.        Sincerely,        Jass Taylor MD

## 2018-06-13 NOTE — PATIENT INSTRUCTIONS
1. Subacromial impingement of left shoulder      An order was made for Physical therapy: [facility] - [phone].    They will call you to schedule, if you do not hear from them in 1-2 business days please do not hesitate to call.     Steroid injection of the left shoulder: subacromial space was performed today in clinic  Icing for the next 1-2 days may be helpful for pain. Injection may take 10-14 days to see the full effect.

## 2018-06-13 NOTE — PROGRESS NOTES
HISTORY OF PRESENT ILLNESS:    Liss Berg is a 53 year old female who is seen in follow up for left shoulder MRI results. Last office visit 5/30/18.  Present symptoms: increased pain in arm, sharp pain located lateral upper arm and posterior aspect of shoulder. decreased ROM above shoulder height with forward flexion, chest height with abduction, and extension.  Decreased strength in left arm >10#. Unable to sleep at night due to increased shoulder pain.     Treatments tried to this point: ibuprofen.    PHYSICAL EXAM:  [facility] 04/05/2016  There is no height or weight on file to calculate BMI.   GENERAL APPEARANCE: healthy, alert and no distress   SKIN: no suspicious lesions or rashes  NEURO: Normal strength and tone, mentation intact and speech normal  VASCULAR:  good pulses, and cappillary refill   LYMPH: no lymphadenopathy   PSYCH:  mentation appears normal and affect normal/bright    MSK:  Examination of the neck and shoulder girdle musculature reveals no asymmetry, or atrophy to the muscle masses.  There is no erythema, ecchymosis or edema.  There is a normal lordosis to the cervical and lumbar spine regions.  There is a normal kyphosis to the thoracic spine.  There is no clinical evidence of scoliosis. There is no tenderness to palpation or percussion.  There is no paraspinal muscle spasm present.  There is a Normal range of motion to the cervical spine. Forward flexion to 45, extension to 55, R and L lateral bending to 45, and rotation to 70, both R and L.    Strength : Bicep 5/5   C5-6       Sensation :     Deltoid 5/5   C5    Lateral Arm    Wrist extensors 5/5  C6    Thumb    Tricep  5/5   C7    Middle Finger    Finger flexors  5/5  C8    Little Finger    Interossei  5/5   T1    Medial Arm    Reflexes :  Bicep   Symmetric  C5-6    BR Symmetric  C6    Tricep Symmetric  C7    Shoulder:  Examination of the left shoulder reveals a partial active ROM and full passive ROM.    Forward Flexion : 180  degrees Pain  YES --   Abduction  :  180 degrees  YES --   Internal Rotation : lumbar 1  YES --  Subscap Lift-off test negative  External Rotation :    0 Deg-90  90 Deg- 90  Contralateral side symmetric    There is no tenderness to palpation about the AC joint, anterior capsule or Bicep tendon.  There is mild tenderness to palpation in the subacromial space.  There is give-way rotator cuff weakness.  Speed's an Yergason's Tests for Bicep pathology are negative. Arm adduction does not cause pain in the AC joint.  Apprehension sign is negative. Scapular winging is not present. ROM of the elbow and wrist is normal and CMS is intact to fingertips.        IMAGING INTERPRETATION:     MR LEFT SHOULDER WITHOUT CONTRAST 6/7/2018 6:45 PM     HISTORY: Six-month history of left shoulder pain and stiffness. No  specific injury.     TECHNIQUE: Oblique coronal T1, T2 and T2 fat suppressed images,  oblique sagittal T2 and axial proton density and T2 weighted images  were obtained.      COMPARISON: None.     FINDINGS:  Osseous acromial outlet: Moderate hypertrophic degenerative change at  the acromioclavicular joint abuts but does not indent the  supraspinatus myotendinous junction. Distal acromial morphology is  type one with near neutral slope on oblique sagittal images and no  lateral downsloping oblique coronal images. There is no abnormal fluid  in the subacromial/subdeltoid bursa.     Rotator cuff: Mild signal heterogeneity lateral supraspinatus tendon  consistent with degenerative tendinopathy. No evidence for partial or  full-thickness tear, tendon retraction or supraspinatus muscle  atrophy. The infraspinatus muscle and tendon, teres minor muscle and  subscapularis muscle and tendon are normal.     Labrum: The glenoid labrum is normal as visualized.     Biceps tendon: Intrasubstance increased signal in the biceps tendon as  it courses over the humeral head consistent with early mucoid  degeneration (images 12 and 13 of  series 8). The tendon appears normal  at the level of the bicipital groove.       Osseous structures and cartilaginous surfaces: Mild cystic resorptive  changes in the posterolateral humeral head and greater tuberosity near  the insertion of the rotator cuff. No acute bony abnormality and  glenohumeral articular cartilages are unremarkable.     Additional findings: None.         IMPRESSION:   1. Supraspinatus degenerative tendinopathy. No evidence for rotator  cuff tear.  2. Mucoid degeneration proximal biceps tendon.     DONYA BEAL MD       ASSESSMENT / PLAN: Left shoulder subacromial impingement.  I offered her corticosteroid injection.    Procedure Note:  After risks and benefits were explained and questions answered, pt agreed to undergo a left subacromial shoulder injection. Using aseptic technique and a posterior approach, the subacromial space was infiltrated with 4 mg of Dexamethasone, 40 mg of Depo Medrol, and 3 cc of 1.0% Lidocaine.  There were no complications and the patient tolerated the procedure well.      Oneal Taylor MD  Dept. Orthopedic Surgery  [facility]

## 2018-06-13 NOTE — MR AVS SNAPSHOT
After Visit Summary   6/13/2018    Liss Berg    MRN: [id number]           Patient Information     Date Of Birth          1965        Visit Information        Provider Department      6/13/2018 3:40 PM Jass Taylor MD [facility] ORTHOPEDIC SURGERY        Today's Diagnoses     Subacromial impingement of left shoulder    -  1      Care Instructions    1. Subacromial impingement of left shoulder      An order was made for Physical therapy: [facility] - [phone].    They will call you to schedule, if you do not hear from them in 1-2 business days please do not hesitate to call.     Steroid injection of the left shoulder: subacromial space was performed today in clinic  Icing for the next 1-2 days may be helpful for pain. Injection may take 10-14 days to see the full effect.            Follow-ups after your visit        Your next 10 appointments already scheduled     Jun 18, 2018  9:30 AM CDT   CT CHEST/ABDOMEN/PELVIS W CONTRAST with [id number] ([facility])    [address] [address] [address] [phone]           Please bring any scans or X-rays taken at other hospitals, if similar tests were done. Also bring a list of your medicines, including vitamins, minerals and over-the-counter drugs. It is safest to leave personal items at home.  Be sure to tell your doctor:   If you have any allergies.   If there s any chance you are pregnant.   If you are breastfeeding.  How to prepare:   Do not eat or drink for 2 hours before your exam. If you need to take medicine, you may take it with small sips of water. (We may ask you to take liquid medicine as well.)   Please wear loose clothing, such as a sweat suit or jogging clothes. Avoid snaps, zippers and other metal. We may ask you to undress and put on a hospital gown.  Please arrive 30 minutes early for your CT. Once in the  department you might be asked to drink water 15-20 minutes prior to your exam.  If indicated you may be asked to drink an oral contrast in advance of your CT.  If this is the case, the imaging team will let you know or be in contact with you prior to your appointment  Patients over 70 or patients with diabetes or kidney problems:   If you haven t had a blood test (creatinine test) within the last 30 days, the Cardiologist/Radiologist may require you to get this test prior to your exam.  If you have diabetes:   Continue to take your metformin medication on the day of your exam  If you have any questions, please call the Imaging Department where you will have your exam.              Who to contact     If you have questions or need follow up information about today's clinic visit or your schedule please contact [facility] ORTHOPEDIC SURGERY directly at [phone].  Normal or non-critical lab and imaging results will be communicated to you by MyChart, letter or phone within 4 business days after the clinic has received the results. If you do not hear from us within 7 days, please contact the clinic through MyChart or phone. If you have a critical or abnormal lab result, we will notify you by phone as soon as possible.  Submit refill requests through "1,2,3 Listo" or call your pharmacy and they will forward the refill request to us. Please allow 3 business days for your refill to be completed.          Additional Information About Your Visit        Care EveryWhere ID     This is your Care EveryWhere ID. This could be used by other organizations to access your [place] medical records  [id number]        Your Vitals Were     Last Period BMI (Body Mass Index)                04/05/2016 40.17 kg/m2           Blood Pressure from Last 3 Encounters:   06/13/18 138/88   05/30/18 146/86   05/24/18 138/76    Weight from Last 3 Encounters:   06/13/18 234 lb (106.1 kg)   05/30/18 234 lb (106.1 kg)   05/24/18 234 lb 6.4 oz (106.3  kg)              Today, you had the following     No orders found for display       Primary Care Provider Office Phone # Fax #    Alina Mahmood [phone]       [address]  [facility] [address]        Equal Access to Services     VALERIA MERLOS : Hadii bennett ku haderino Soomaali, waaxda luqadaha, qaybta kaalmada adeegyada, delbert burgosn hermann burgess lafernandoshahida . So [facility] [phone].    ATENCIÓN: Si habla español, tiene a costello disposición servicios gratuitos de asistencia lingüística. Llame al [phone].    We comply with applicable federal civil rights laws and [place] laws. We do not discriminate on the basis of race, color, national origin, age, disability, sex, sexual orientation, or gender identity.            Thank you!     Thank you for choosing [facility] ORTHOPEDIC SURGERY  for your care. Our goal is always to provide you with excellent care. Hearing back from our patients is one way we can continue to improve our services. Please take a few minutes to complete the written survey that you may receive in the mail after your visit with us. Thank you!             Your Updated Medication List - Protect others around you: Learn how to safely use, store and throw away your medicines at www.disposemymeds.org.          This list is accurate as of 6/13/18  3:58 PM.  Always use your most recent med list.                   Brand Name Dispense Instructions for use Diagnosis    albuterol 108 (90 Base) MCG/ACT Inhaler    PROAIR HFA/PROVENTIL HFA/VENTOLIN HFA    1 Inhaler    Inhale 2 puffs into the lungs every 6 hours as needed for shortness of breath / dyspnea or wheezing    Mild intermittent asthma without complication       ferrous sulfate 325 (65 Fe) MG tablet    IRON    90 tablet    Take 1 tablet (325 mg) by mouth daily (with breakfast)    Iron deficiency anemia, unspecified iron deficiency anemia type       losartan 50 MG tablet    COZAAR    90 tablet    Take 1 tablet (50 mg) by  mouth daily    Benign essential hypertension       metoprolol tartrate 100 MG tablet    LOPRESSOR    180 tablet    Take 1 tablet (100 mg) by mouth 2 times daily    Benign essential hypertension       traZODone 50 MG tablet    DESYREL    90 tablet    Take 0.5-2 tablets ( mg) by mouth nightly as needed for sleep    Persistent disorder of initiating or maintaining sleep

## 2018-06-18 ENCOUNTER — HOSPITAL ENCOUNTER (OUTPATIENT)
Dept: CT IMAGING | Facility: CLINIC | Age: 53
Discharge: HOME | End: 2018-06-18
Attending: INTERNAL MEDICINE | Admitting: INTERNAL MEDICINE
Payer: COMMERCIAL

## 2018-06-18 DIAGNOSIS — C18.9 MALIGNANT NEOPLASM OF COLON, UNSPECIFIED PART OF COLON (H): ICD-10-CM

## 2018-06-18 DIAGNOSIS — R91.8 PULMONARY NODULES: ICD-10-CM

## 2018-06-18 PROCEDURE — 25000128 H RX IP 250 OP 636: Performed by: RADIOLOGY

## 2018-06-18 PROCEDURE — 71260 CT THORAX DX C+: CPT

## 2018-06-18 RX ORDER — IOPAMIDOL 755 MG/ML
500 INJECTION, SOLUTION INTRAVASCULAR ONCE
Status: COMPLETED | OUTPATIENT
Start: 2018-06-18 | End: 2018-06-18

## 2018-06-18 RX ADMIN — SODIUM CHLORIDE 65 ML: 9 INJECTION, SOLUTION INTRAVENOUS at 09:45

## 2018-06-18 RX ADMIN — IOPAMIDOL 100 ML: 755 INJECTION, SOLUTION INTRAVENOUS at 09:45

## 2018-06-22 ENCOUNTER — THERAPY VISIT (OUTPATIENT)
Dept: PHYSICAL THERAPY | Facility: CLINIC | Age: 53
End: 2018-06-22
Payer: COMMERCIAL

## 2018-06-22 DIAGNOSIS — M25.512 LEFT SHOULDER PAIN: Primary | ICD-10-CM

## 2018-06-22 PROCEDURE — 97110 THERAPEUTIC EXERCISES: CPT | Mod: GP | Performed by: PHYSICAL THERAPIST

## 2018-06-22 PROCEDURE — 97161 PT EVAL LOW COMPLEX 20 MIN: CPT | Mod: GP | Performed by: PHYSICAL THERAPIST

## 2018-06-22 NOTE — PROGRESS NOTES
[facility] Initial Evaluation  Liss Berg is a 53 year old  female referred to physical therapy by Dr. Taylor for treatment of L shoulder pain with Precautions/Restrictions/MD instructions Eval & Treat         Physical Therapy Initial Evaluation: Subjective History      Injury/Condition Details:  Presenting Complaint L shoulder pain   Onset Timing/Date January 2018   Mechanism Insidious      Symptom Behavior Details    Primary Pain Symptoms Location: Back of L shoulder  Quality: Sharp  Frequency: Intermittent with movement   Worst Pain 10/10   Best Pain 0/10   Symptom Provocators Overhead reaching, washing hair, Lifting and twisting   Symptom Relievers Rest   Time of day dependent? Sleep, lays on L side   Recent symptom change? None      Prior Testing/Intervention for current condition:  Prior Tests MRI  IMPRESSION:   1. Supraspinatus degenerative tendinopathy. No evidence for rotator  cuff tear.  2. Mucoid degeneration proximal biceps tendon.   Prior Treatment Cortisone injection in L shoulder      Lifestyle & General Medical History:  General Health Reported by Patient Good   Employment Dietary manager/cook   Usual physical activities  (within past year) Playing with grandkids, work without pain   Orthopaedic history Colon cancer   Notable medical history Cancer, High blood pressure                 HPI                  SHOULDER EVALUATION      Range of Motion:    AROM Flexion Abduction Flex/ER Ext/IR   Left 140 120 T1 L2   Right 180 180 T2 T8     PROM Flex Abd ER @ 90 IR @ 90   Left 95 95 20 65     Strength:  MMT Scaption ER @ 0 IR @ 0   Left 5/5 4/5 5/5   Right 5/5 5/5 5/5     Special Tests  Left Right   Speed's +    Mahajan-Dwayne + -   Neer + -   Whittington's +                                               Palpation:  Left: Posterior L shoulder unremarkable            System    Physical Exam    General     ROS    Assessment/Plan:    Patient is a 53 year old female with left side  shoulder complaints.    Patient has the following significant findings with corresponding treatment plan.                Diagnosis 1:  L shoulder pain  Pain -  manual therapy, splint/taping/bracing, self-management, education, and home program  Decreased ROM/flexibility - manual therapy, therapeutic exercise and home program  Decreased joint mobility - manual therapy, therapeutic exercise and home program  Decreased strength - therapeutic exercise, therapeutic activities and home program  Impaired muscle performance - neuro re-education and home program  Decreased function - therapeutic activities and home program    Therapy Evaluation Codes:   1) History comprised of:   Personal factors that impact the plan of care:      None.    Comorbidity factors that impact the plan of care are:      Cancer and High blood pressure.     Medications impacting care: High blood pressure.  2) Examination of Body Systems comprised of:   Body structures and functions that impact the plan of care:      Shoulder.   Activity limitations that impact the plan of care are:      Bathing, Cooking, Dressing, Lifting, Throwing, Working and Sleeping.  3) Clinical presentation characteristics are:   Stable/Uncomplicated.  4) Decision-Making    Low complexity using standardized patient assessment instrument and/or measureable assessment of functional outcome.  Cumulative Therapy Evaluation is: Low complexity.    Previous and current functional limitations:  (See Goal Flow Sheet for this information)    Short term and Long term goals: (See Goal Flow Sheet for this information)     Communication ability:  Patient appears to be able to clearly communicate and understand verbal and written communication and follow directions correctly.  Treatment Explanation - The following has been discussed with the patient:   RX ordered/plan of care  Anticipated outcomes  Possible risks and side effects  This patient would benefit from PT intervention to resume  normal activities.   Rehab potential is good.    Frequency:  1 X week, once daily  Duration:  for 6 weeks  Discharge Plan:  Achieve all LTG.  Independent in home treatment program.  Reach maximal therapeutic benefit.    Please refer to the daily flowsheet for treatment today, total treatment time and time spent performing 1:1 timed codes.

## 2018-06-25 ENCOUNTER — TRANSFERRED RECORDS (OUTPATIENT)
Dept: HEALTH INFORMATION MANAGEMENT | Facility: CLINIC | Age: 53
End: 2018-06-25

## 2018-06-25 ENCOUNTER — TRANSFERRED RECORDS (OUTPATIENT)
Dept: SURGERY | Facility: CLINIC | Age: 53
End: 2018-06-25

## 2018-06-26 ENCOUNTER — TELEPHONE (OUTPATIENT)
Dept: SURGERY | Facility: CLINIC | Age: 53
End: 2018-06-26

## 2018-06-26 NOTE — TELEPHONE ENCOUNTER
Type of surgery: POWER PORT A CATHETER REMOVAL    Location of surgery: [facility]  Date and time of surgery: 7/5/2018 @ 8:30 AM   Surgeon: MICIH HERNANDEZ MD    Pre-Op Appt Date: PATIENT  TO SCHEDYULE   Post-Op Appt Date: PATIENT  TO SCHEDULE    Packet sent out: Yes 6/26/2018  Pre-cert/Authorization completed:  Not Applicable  Date: 6/26/2018    POWER PORT A CATHETER REMOVAL    [facility] PT INST TO HAVE H&P WITH DR DELGADO 30 MIN REQ NON DJM NMS

## 2018-06-29 RX ORDER — CEFAZOLIN SODIUM 2 G/100ML
2 INJECTION, SOLUTION INTRAVENOUS
Status: CANCELLED | OUTPATIENT
Start: 2018-06-29

## 2018-06-29 RX ORDER — CEFAZOLIN SODIUM 1 G/3ML
1 INJECTION, POWDER, FOR SOLUTION INTRAMUSCULAR; INTRAVENOUS SEE ADMIN INSTRUCTIONS
Status: CANCELLED | OUTPATIENT
Start: 2018-06-29

## 2018-07-03 ENCOUNTER — OFFICE VISIT (OUTPATIENT)
Dept: INTERNAL MEDICINE | Facility: CLINIC | Age: 53
End: 2018-07-03
Payer: COMMERCIAL

## 2018-07-03 VITALS
TEMPERATURE: 98.5 F | WEIGHT: 232.5 LBS | OXYGEN SATURATION: 99 % | BODY MASS INDEX: 39.91 KG/M2 | SYSTOLIC BLOOD PRESSURE: 144 MMHG | RESPIRATION RATE: 16 BRPM | HEART RATE: 66 BPM | DIASTOLIC BLOOD PRESSURE: 84 MMHG

## 2018-07-03 DIAGNOSIS — Z01.818 PREOP GENERAL PHYSICAL EXAM: ICD-10-CM

## 2018-07-03 DIAGNOSIS — I42.2 HYPERTROPHIC CARDIOMYOPATHY (H): ICD-10-CM

## 2018-07-03 DIAGNOSIS — E66.01 MORBID OBESITY (H): ICD-10-CM

## 2018-07-03 DIAGNOSIS — C19 COLORECTAL CARCINOMA (H): ICD-10-CM

## 2018-07-03 DIAGNOSIS — Z01.818 PRE-OP EXAM: Primary | ICD-10-CM

## 2018-07-03 PROCEDURE — 99214 OFFICE O/P EST MOD 30 MIN: CPT | Performed by: PHYSICIAN ASSISTANT

## 2018-07-03 PROCEDURE — 93000 ELECTROCARDIOGRAM COMPLETE: CPT | Performed by: PHYSICIAN ASSISTANT

## 2018-07-03 NOTE — MR AVS SNAPSHOT
After Visit Summary   7/3/2018    Liss Berg    MRN: [id number]           Patient Information     Date Of Birth          1965        Visit Information        Provider Department      7/3/2018 2:30 PM Marisabel Powell PA-C [facility]        Today's Diagnoses     Pre-op exam    -  1    Preop general physical exam        Morbid obesity (H)        Colorectal carcinoma (H)        Hypertrophic cardiomyopathy (H)          Care Instructions      Before Your Surgery      Call your surgeon if there is any change in your health. This includes signs of a cold or flu (such as a sore throat, runny nose, cough, rash or fever).    Do not smoke, drink alcohol or take over the counter medicine (unless your surgeon or primary care doctor tells you to) for the 24 hours before and after surgery.    If you take prescribed drugs: Follow your doctor s orders about which medicines to take and which to stop until after surgery.    Eating and drinking prior to surgery: follow the instructions from your surgeon    Take a shower or bath the night before surgery. Use the soap your surgeon gave you to gently clean your skin. If you do not have soap from your surgeon, use your regular soap. Do not shave or scrub the surgery site.  Wear clean pajamas and have clean sheets on your bed.           Follow-ups after your visit        Your next 10 appointments already scheduled     Jul 05, 2018   Procedure with Miguel Angel Yost MD   [facility] (--)    [address] [facility] [address]   [phone]            Jul 05, 2018  8:30 AM CDT   [facility] Same Day Surgery with Miguel Angel Yost MD   [facility] ([facility])    [facility] ([facility])   [phone]            Jul 11, 2018  3:10 PM CDT   [facility] Extremity with Elieser Hutchison, PT   [facility]    [address]  [facility]  [address]  [facility] [address]   [phone]              Who to contact     If you have questions or need follow up information about today's clinic visit or your schedule please contact [facility] directly at [phone].  Normal or non-critical lab and imaging results will be communicated to you by MyChart, letter or phone within 4 business days after the clinic has received the results. If you do not hear from us within 7 days, please contact the clinic through MyChart or phone. If you have a critical or abnormal lab result, we will notify you by phone as soon as possible.  Submit refill requests through Testt or call your pharmacy and they will forward the refill request to us. Please allow 3 business days for your refill to be completed.          Additional Information About Your Visit        Care EveryWhere ID     This is your Care EveryWhere ID. This could be used by other organizations to access your [facility] medical records  [id number]        Your Vitals Were     Pulse Temperature Respirations Last Period Pulse Oximetry BMI (Body Mass Index)    66 98.5  F (36.9  C) (Oral) 16 04/05/2016 99% 39.91 kg/m2       Blood Pressure from Last 3 Encounters:   07/03/18 144/84   06/13/18 138/88   05/30/18 146/86    Weight from Last 3 Encounters:   07/03/18 232 lb 8 oz (105.5 kg)   06/13/18 234 lb (106.1 kg)   05/30/18 234 lb (106.1 kg)              We Performed the Following     EKG 12-lead complete w/read - Clinics        Primary Care Provider Office Phone # Fax #    Alina Mahmood [phone]       [address] [facility] [address]        Equal Access to Services     SALAZAR MERLOS : Hadii aad ku hadasho Soomaali, waaxda luqadaha, qaybta kaalmada adeegyada, delbert rasmussen . So [facility] [phone].    ATENCIÓN: Si habla español, tiene a costello disposición servicios gratuitos de asistencia lingüística. Shayneame al [phone].    We  comply with applicable federal civil rights laws and [place] laws. We do not discriminate on the basis of race, color, national origin, age, disability, sex, sexual orientation, or gender identity.            Thank you!     Thank you for choosing [facility]  for your care. Our goal is always to provide you with excellent care. Hearing back from our patients is one way we can continue to improve our services. Please take a few minutes to complete the written survey that you may receive in the mail after your visit with us. Thank you!             Your Updated Medication List - Protect others around you: Learn how to safely use, store and throw away your medicines at www.disposemymeds.org.          This list is accurate as of 7/3/18  3:07 PM.  Always use your most recent med list.                   Brand Name Dispense Instructions for use Diagnosis    albuterol 108 (90 Base) MCG/ACT Inhaler    PROAIR HFA/PROVENTIL HFA/VENTOLIN HFA    1 Inhaler    Inhale 2 puffs into the lungs every 6 hours as needed for shortness of breath / dyspnea or wheezing    Mild intermittent asthma without complication       dexamethasone 4 MG/ML injection    DECADRON    1 mL    Inject 1 mL (4 mg) as directed once for 1 dose    Subacromial impingement of left shoulder       ferrous sulfate 325 (65 Fe) MG tablet    IRON    90 tablet    Take 1 tablet (325 mg) by mouth daily (with breakfast)    Iron deficiency anemia, unspecified iron deficiency anemia type       losartan 50 MG tablet    COZAAR    90 tablet    Take 1 tablet (50 mg) by mouth daily    Benign essential hypertension       metoprolol tartrate 100 MG tablet    LOPRESSOR    180 tablet    Take 1 tablet (100 mg) by mouth 2 times daily    Benign essential hypertension       traZODone 50 MG tablet    DESYREL    90 tablet    Take 0.5-2 tablets ( mg) by mouth nightly as needed for sleep    Persistent disorder of initiating or maintaining sleep

## 2018-07-03 NOTE — H&P (VIEW-ONLY)
[place] [address]  [facility] [address]  [phone]  Dept: [phone]    PRE-OP EVALUATION:  Today's date: 7/3/2018    Liss Berg (: 1965) presents for pre-operative evaluation assessment as requested by Dr. Yost.  She requires evaluation and anesthesia risk assessment prior to undergoing surgery/procedure for treatment of Port removal from left chest  .    Fax number for surgical facility:   Primary Physician: Alina Mahmood  Type of Anesthesia Anticipated: to be determined    Patient has a Health Care Directive or Living Will:  YES     Preop Questions 7/3/2018   Who is doing your surgery? Dr Yost   What are you having done? Port removal   Date of Surgery/Procedure:    Facility or Hospital where procedure/surgery will be performed: [facility]   1.  Do you have a history of Heart attack, stroke, stent, coronary bypass surgery, or other heart surgery? No   2.  Do you ever have any pain or discomfort in your chest? No   3.  Do you have a history of  Heart Failure? No   4.   Are you troubled by shortness of breath when:  walking on a level surface, or up a slight hill, or at night? No   5.  Do you currently have a cold, bronchitis or other respiratory infection? No   6.  Do you have a cough, shortness of breath, or wheezing? No   7.  Do you sometimes get pains in the calves of your legs when you walk? No   8. Do you or anyone in your family have previous history of blood clots? No   9.  Do you or does anyone in your family have a serious bleeding problem such as prolonged bleeding following surgeries or cuts? No   10. Have you ever had problems with anemia or been told to take iron pills? No   11. Have you had any abnormal blood loss such as black, tarry or bloody stools, or abnormal vaginal bleeding? No   12. Have you ever had a blood transfusion? No   13. Have you or any of your relatives ever had problems with anesthesia? No   14. Do  you have sleep apnea, excessive snoring or daytime drowsiness? No   15. Do you have any prosthetic heart valves? No   16. Do you have prosthetic joints? No   17. Is there any chance that you may be pregnant? No         HPI:     HPI related to upcoming procedure: Patient has been getting blood draws from her port and it has stopped working for access so she is having it removed.       MEDICAL HISTORY:     Patient Active Problem List    Diagnosis Date Noted     Morbid obesity (H) 07/03/2018     Priority: Medium     Hypertrophic cardiomyopathy (H) 07/03/2018     Priority: Medium     Left shoulder pain 06/22/2018     Priority: Medium     Chronic left shoulder pain 05/24/2018     Priority: Medium     Mild intermittent asthma without complication 09/20/2016     Priority: Medium     Benign essential hypertension 05/25/2016     Priority: Medium     Colorectal carcinoma (H) 05/24/2016     Priority: Medium     Abdominal pain 05/10/2016     Priority: Medium     Cellulitis of leg 08/18/2013     Priority: Medium     Cellulitis 08/18/2013     Priority: Medium      Past Medical History:   Diagnosis Date     Asthma      Heart murmur      Hypertension      Past Surgical History:   Procedure Laterality Date     COLECTOMY RIGHT Right 5/11/2016    Procedure: COLECTOMY RIGHT;  Surgeon: Miguel Angel Yost MD;  Location:  OR     INSERT PORT VASCULAR ACCESS Left 7/5/2016    Procedure: INSERT PORT VASCULAR ACCESS;  Surgeon: Juan Carlos Lopez MD;  Location:  OR     LAPAROSCOPIC ASSISTED COLECTOMY Right 5/11/2016    Procedure: LAPAROSCOPIC ASSISTED COLECTOMY;  Surgeon: Miguel Angel Yost MD;  Location:  OR     Current Outpatient Prescriptions   Medication Sig Dispense Refill     albuterol (PROAIR HFA, PROVENTIL HFA, VENTOLIN HFA) 108 (90 BASE) MCG/ACT inhaler Inhale 2 puffs into the lungs every 6 hours as needed for shortness of breath / dyspnea or wheezing 1 Inhaler 1     ferrous sulfate (IRON) 325 (65 FE) MG tablet Take 1 tablet (325 mg)  by mouth daily (with breakfast) 90 tablet 0     losartan (COZAAR) 50 MG tablet Take 1 tablet (50 mg) by mouth daily 90 tablet 1     metoprolol tartrate (LOPRESSOR) 100 MG tablet Take 1 tablet (100 mg) by mouth 2 times daily 180 tablet 1     traZODone (DESYREL) 50 MG tablet Take 0.5-2 tablets ( mg) by mouth nightly as needed for sleep 90 tablet 1     dexamethasone (DECADRON) 4 MG/ML injection Inject 1 mL (4 mg) as directed once for 1 dose 1 mL 0     OTC products: None, except as noted above    Allergies   Allergen Reactions     Morphine      could trigger asthma attacks as she uses inhalers     Penicillins Hives      Latex Allergy: NO    Social History   Substance Use Topics     Smoking status: Never Smoker     Smokeless tobacco: Never Used     Alcohol use 0.0 oz/week     0 Standard drinks or equivalent per week      Comment: less that once per month - very rare     History   Drug Use No       REVIEW OF SYSTEMS:   CONSTITUTIONAL: NEGATIVE for fever, chills, change in weight  ENT/MOUTH: NEGATIVE for ear, mouth and throat problems  RESP: NEGATIVE for significant cough or SOB  CV: NEGATIVE for chest pain, palpitations or peripheral edema  : negative for and dysuria  HEME/ALLERGY/IMMUNE: NEGATIVE for bleeding problems  ROS otherwise negative    EXAM:   /84  Pulse 66  Temp 98.5  F (36.9  C) (Oral)  Resp 16  Wt 232 lb 8 oz (105.5 kg)  LMP 04/05/2016  SpO2 99%  BMI 39.91 kg/m2    GENERAL APPEARANCE: healthy, alert and no distress     EYES: EOMI, PERRL     HENT: ear canals and TM's normal and nose and mouth without ulcers or lesions     NECK: no adenopathy, no asymmetry, masses, or scars and thyroid normal to palpation     RESP: lungs clear to auscultation - no rales, rhonchi or wheezes     CV: regular rates and rhythm     ABDOMEN:  soft, nontender, no HSM or masses and bowel sounds normal     MS: extremities normal- no gross deformities noted     SKIN: non infected port below left clavicle      NEURO:  Normal strength and tone, sensory exam grossly normal, mentation intact and speech normal     PSYCH: mentation appears normal. and affect normal/bright     LYMPHATICS: No cervical adenopathy    DIAGNOSTICS:   EKG: appears normal, NSR, diffuse non specific T wave abnormality , unchanged from previous tracings    Recent Labs   Lab Test  05/24/18   1431 08/22/16 05/09/16   2150   08/18/13   1920   HGB  12.7  9.2*   < >  8.0*   < >  12.4   PLT  212  112*   < >  327   < >  213   INR   --    --    --   1.04   --    --    NA  143  140   < >  140   < >  142   POTASSIUM  4.0  3.6   < >  2.9*   < >  3.6   CR  0.83  0.84   < >  0.71   < >  0.62   A1C   --    --    --    --    --   5.3    < > = values in this interval not displayed.        IMPRESSION:   Reason for surgery/procedure: port removal  Diagnosis/reason for consult: Pre Op    The proposed surgical procedure is considered LOW risk.    REVISED CARDIAC RISK INDEX  The patient has the following serious cardiovascular risks for perioperative complications such as (MI, PE, VFib and 3  AV Block):  No serious cardiac risks  INTERPRETATION: 0 risks: Class I (very low risk - 0.4% complication rate)    The patient has the following additional risks for perioperative complications:  No identified additional risks        ICD-10-CM    1. Pre-op exam Z01.818 EKG 12-lead complete w/read - Clinics   2. Preop general physical exam Z01.818    3. Morbid obesity (H) E66.01    4. Colorectal carcinoma (H) C19    5. Hypertrophic cardiomyopathy (H) I42.2        RECOMMENDATIONS:     --Patient is to take all scheduled medications on the day of surgery EXCEPT for modifications listed below.    ACE Inhibitor or Angiotensin Receptor Blocker (ARB) Use  Ace inhibitor or Angiotensin Receptor Blocker (ARB) and should HOLD this medication for the 24 hours prior to surgery.      APPROVAL GIVEN to proceed with proposed procedure, without further diagnostic evaluation       Signed Electronically by:  Marisabel Powell PA-C    Copy of this evaluation report is provided to requesting physician.    [facility] Preop Guidelines    Revised Cardiac Risk Index

## 2018-07-03 NOTE — NURSING NOTE
/84  Pulse 66  Temp 98.5  F (36.9  C) (Oral)  Resp 16  Wt 232 lb 8 oz (105.5 kg)  LMP 04/05/2016  SpO2 99%  BMI 39.91 kg/m2

## 2018-07-03 NOTE — PROGRESS NOTES
[place] [address]  [facility] [address]  [phone]  Dept: [phone]    PRE-OP EVALUATION:  Today's date: 7/3/2018    Liss Berg (: 1965) presents for pre-operative evaluation assessment as requested by Dr. Yost.  She requires evaluation and anesthesia risk assessment prior to undergoing surgery/procedure for treatment of Port removal from left chest  .    Fax number for surgical facility:   Primary Physician: Alina Mahmood  Type of Anesthesia Anticipated: to be determined    Patient has a Health Care Directive or Living Will:  YES     Preop Questions 7/3/2018   Who is doing your surgery? Dr Yost   What are you having done? Port removal   Date of Surgery/Procedure:    Facility or Hospital where procedure/surgery will be performed: [facility]   1.  Do you have a history of Heart attack, stroke, stent, coronary bypass surgery, or other heart surgery? No   2.  Do you ever have any pain or discomfort in your chest? No   3.  Do you have a history of  Heart Failure? No   4.   Are you troubled by shortness of breath when:  walking on a level surface, or up a slight hill, or at night? No   5.  Do you currently have a cold, bronchitis or other respiratory infection? No   6.  Do you have a cough, shortness of breath, or wheezing? No   7.  Do you sometimes get pains in the calves of your legs when you walk? No   8. Do you or anyone in your family have previous history of blood clots? No   9.  Do you or does anyone in your family have a serious bleeding problem such as prolonged bleeding following surgeries or cuts? No   10. Have you ever had problems with anemia or been told to take iron pills? No   11. Have you had any abnormal blood loss such as black, tarry or bloody stools, or abnormal vaginal bleeding? No   12. Have you ever had a blood transfusion? No   13. Have you or any of your relatives ever had problems with anesthesia? No   14. Do  you have sleep apnea, excessive snoring or daytime drowsiness? No   15. Do you have any prosthetic heart valves? No   16. Do you have prosthetic joints? No   17. Is there any chance that you may be pregnant? No         HPI:     HPI related to upcoming procedure: Patient has been getting blood draws from her port and it has stopped working for access so she is having it removed.       MEDICAL HISTORY:     Patient Active Problem List    Diagnosis Date Noted     Morbid obesity (H) 07/03/2018     Priority: Medium     Hypertrophic cardiomyopathy (H) 07/03/2018     Priority: Medium     Left shoulder pain 06/22/2018     Priority: Medium     Chronic left shoulder pain 05/24/2018     Priority: Medium     Mild intermittent asthma without complication 09/20/2016     Priority: Medium     Benign essential hypertension 05/25/2016     Priority: Medium     Colorectal carcinoma (H) 05/24/2016     Priority: Medium     Abdominal pain 05/10/2016     Priority: Medium     Cellulitis of leg 08/18/2013     Priority: Medium     Cellulitis 08/18/2013     Priority: Medium      Past Medical History:   Diagnosis Date     Asthma      Heart murmur      Hypertension      Past Surgical History:   Procedure Laterality Date     COLECTOMY RIGHT Right 5/11/2016    Procedure: COLECTOMY RIGHT;  Surgeon: Miguel Angel Yost MD;  Location:  OR     INSERT PORT VASCULAR ACCESS Left 7/5/2016    Procedure: INSERT PORT VASCULAR ACCESS;  Surgeon: Juan Carlos Lopez MD;  Location:  OR     LAPAROSCOPIC ASSISTED COLECTOMY Right 5/11/2016    Procedure: LAPAROSCOPIC ASSISTED COLECTOMY;  Surgeon: Miguel Angel Yost MD;  Location:  OR     Current Outpatient Prescriptions   Medication Sig Dispense Refill     albuterol (PROAIR HFA, PROVENTIL HFA, VENTOLIN HFA) 108 (90 BASE) MCG/ACT inhaler Inhale 2 puffs into the lungs every 6 hours as needed for shortness of breath / dyspnea or wheezing 1 Inhaler 1     ferrous sulfate (IRON) 325 (65 FE) MG tablet Take 1 tablet (325 mg)  by mouth daily (with breakfast) 90 tablet 0     losartan (COZAAR) 50 MG tablet Take 1 tablet (50 mg) by mouth daily 90 tablet 1     metoprolol tartrate (LOPRESSOR) 100 MG tablet Take 1 tablet (100 mg) by mouth 2 times daily 180 tablet 1     traZODone (DESYREL) 50 MG tablet Take 0.5-2 tablets ( mg) by mouth nightly as needed for sleep 90 tablet 1     dexamethasone (DECADRON) 4 MG/ML injection Inject 1 mL (4 mg) as directed once for 1 dose 1 mL 0     OTC products: None, except as noted above    Allergies   Allergen Reactions     Morphine      could trigger asthma attacks as she uses inhalers     Penicillins Hives      Latex Allergy: NO    Social History   Substance Use Topics     Smoking status: Never Smoker     Smokeless tobacco: Never Used     Alcohol use 0.0 oz/week     0 Standard drinks or equivalent per week      Comment: less that once per month - very rare     History   Drug Use No       REVIEW OF SYSTEMS:   CONSTITUTIONAL: NEGATIVE for fever, chills, change in weight  ENT/MOUTH: NEGATIVE for ear, mouth and throat problems  RESP: NEGATIVE for significant cough or SOB  CV: NEGATIVE for chest pain, palpitations or peripheral edema  : negative for and dysuria  HEME/ALLERGY/IMMUNE: NEGATIVE for bleeding problems  ROS otherwise negative    EXAM:   /84  Pulse 66  Temp 98.5  F (36.9  C) (Oral)  Resp 16  Wt 232 lb 8 oz (105.5 kg)  LMP 04/05/2016  SpO2 99%  BMI 39.91 kg/m2    GENERAL APPEARANCE: healthy, alert and no distress     EYES: EOMI, PERRL     HENT: ear canals and TM's normal and nose and mouth without ulcers or lesions     NECK: no adenopathy, no asymmetry, masses, or scars and thyroid normal to palpation     RESP: lungs clear to auscultation - no rales, rhonchi or wheezes     CV: regular rates and rhythm     ABDOMEN:  soft, nontender, no HSM or masses and bowel sounds normal     MS: extremities normal- no gross deformities noted     SKIN: non infected port below left clavicle      NEURO:  Normal strength and tone, sensory exam grossly normal, mentation intact and speech normal     PSYCH: mentation appears normal. and affect normal/bright     LYMPHATICS: No cervical adenopathy    DIAGNOSTICS:   EKG: appears normal, NSR, diffuse non specific T wave abnormality , unchanged from previous tracings    Recent Labs   Lab Test  05/24/18   1431 08/22/16 05/09/16   2150   08/18/13   1920   HGB  12.7  9.2*   < >  8.0*   < >  12.4   PLT  212  112*   < >  327   < >  213   INR   --    --    --   1.04   --    --    NA  143  140   < >  140   < >  142   POTASSIUM  4.0  3.6   < >  2.9*   < >  3.6   CR  0.83  0.84   < >  0.71   < >  0.62   A1C   --    --    --    --    --   5.3    < > = values in this interval not displayed.        IMPRESSION:   Reason for surgery/procedure: port removal  Diagnosis/reason for consult: Pre Op    The proposed surgical procedure is considered LOW risk.    REVISED CARDIAC RISK INDEX  The patient has the following serious cardiovascular risks for perioperative complications such as (MI, PE, VFib and 3  AV Block):  No serious cardiac risks  INTERPRETATION: 0 risks: Class I (very low risk - 0.4% complication rate)    The patient has the following additional risks for perioperative complications:  No identified additional risks        ICD-10-CM    1. Pre-op exam Z01.818 EKG 12-lead complete w/read - Clinics   2. Preop general physical exam Z01.818    3. Morbid obesity (H) E66.01    4. Colorectal carcinoma (H) C19    5. Hypertrophic cardiomyopathy (H) I42.2        RECOMMENDATIONS:     --Patient is to take all scheduled medications on the day of surgery EXCEPT for modifications listed below.    ACE Inhibitor or Angiotensin Receptor Blocker (ARB) Use  Ace inhibitor or Angiotensin Receptor Blocker (ARB) and should HOLD this medication for the 24 hours prior to surgery.      APPROVAL GIVEN to proceed with proposed procedure, without further diagnostic evaluation       Signed Electronically by:  Marisabel Powell PA-C    Copy of this evaluation report is provided to requesting physician.    [facility] Preop Guidelines    Revised Cardiac Risk Index

## 2018-07-05 ENCOUNTER — APPOINTMENT (OUTPATIENT)
Dept: SURGERY | Facility: PHYSICIAN GROUP | Age: 53
End: 2018-07-05
Payer: COMMERCIAL

## 2018-07-05 ENCOUNTER — HOSPITAL ENCOUNTER (OUTPATIENT)
Facility: CLINIC | Age: 53
Discharge: HOME | End: 2018-07-05
Attending: SURGERY | Admitting: SURGERY
Payer: COMMERCIAL

## 2018-07-05 ENCOUNTER — SURGERY (OUTPATIENT)
Age: 53
End: 2018-07-05

## 2018-07-05 ENCOUNTER — ANESTHESIA EVENT (OUTPATIENT)
Dept: SURGERY | Facility: CLINIC | Age: 53
End: 2018-07-05
Payer: COMMERCIAL

## 2018-07-05 ENCOUNTER — ANESTHESIA (OUTPATIENT)
Dept: SURGERY | Facility: CLINIC | Age: 53
End: 2018-07-05
Payer: COMMERCIAL

## 2018-07-05 VITALS
HEIGHT: 64 IN | DIASTOLIC BLOOD PRESSURE: 87 MMHG | TEMPERATURE: 97.6 F | SYSTOLIC BLOOD PRESSURE: 150 MMHG | BODY MASS INDEX: 40.63 KG/M2 | OXYGEN SATURATION: 99 % | RESPIRATION RATE: 17 BRPM | WEIGHT: 238 LBS

## 2018-07-05 DIAGNOSIS — C19 COLORECTAL CARCINOMA (H): Primary | ICD-10-CM

## 2018-07-05 LAB
CREAT SERPL-MCNC: 0.69 MG/DL (ref 0.52–1.04)
GFR SERPL CREATININE-BSD FRML MDRD: 89 ML/MIN/1.7M2
POTASSIUM SERPL-SCNC: 3.8 MMOL/L (ref 3.4–5.3)

## 2018-07-05 PROCEDURE — 25000128 H RX IP 250 OP 636: Performed by: NURSE ANESTHETIST, CERTIFIED REGISTERED

## 2018-07-05 PROCEDURE — 25000125 ZZHC RX 250: Performed by: SURGERY

## 2018-07-05 PROCEDURE — 40000306 ZZH STATISTIC PRE PROC ASSESS II: Performed by: SURGERY

## 2018-07-05 PROCEDURE — 37000008 ZZH ANESTHESIA TECHNICAL FEE, 1ST 30 MIN: Performed by: SURGERY

## 2018-07-05 PROCEDURE — 25000125 ZZHC RX 250: Performed by: NURSE ANESTHETIST, CERTIFIED REGISTERED

## 2018-07-05 PROCEDURE — 84132 ASSAY OF SERUM POTASSIUM: CPT | Performed by: ANESTHESIOLOGY

## 2018-07-05 PROCEDURE — 37000009 ZZH ANESTHESIA TECHNICAL FEE, EACH ADDTL 15 MIN: Performed by: SURGERY

## 2018-07-05 PROCEDURE — 27210995 ZZH RX 272: Performed by: SURGERY

## 2018-07-05 PROCEDURE — 36415 COLL VENOUS BLD VENIPUNCTURE: CPT | Performed by: ANESTHESIOLOGY

## 2018-07-05 PROCEDURE — 71000027 ZZH RECOVERY PHASE 2 EACH 15 MINS: Performed by: SURGERY

## 2018-07-05 PROCEDURE — 82565 ASSAY OF CREATININE: CPT | Performed by: ANESTHESIOLOGY

## 2018-07-05 PROCEDURE — 25000128 H RX IP 250 OP 636: Performed by: SURGERY

## 2018-07-05 PROCEDURE — 36000050 ZZH SURGERY LEVEL 2 1ST 30 MIN: Performed by: SURGERY

## 2018-07-05 PROCEDURE — 27210794 ZZH OR GENERAL SUPPLY STERILE: Performed by: SURGERY

## 2018-07-05 PROCEDURE — 36590 REMOVAL TUNNELED CV CATH: CPT | Performed by: SURGERY

## 2018-07-05 PROCEDURE — 25000128 H RX IP 250 OP 636: Performed by: ANESTHESIOLOGY

## 2018-07-05 RX ORDER — SODIUM CHLORIDE, SODIUM LACTATE, POTASSIUM CHLORIDE, CALCIUM CHLORIDE 600; 310; 30; 20 MG/100ML; MG/100ML; MG/100ML; MG/100ML
INJECTION, SOLUTION INTRAVENOUS CONTINUOUS
Status: DISCONTINUED | OUTPATIENT
Start: 2018-07-05 | End: 2018-07-05 | Stop reason: HOSPADM

## 2018-07-05 RX ORDER — FENTANYL CITRATE 50 UG/ML
25-50 INJECTION, SOLUTION INTRAMUSCULAR; INTRAVENOUS
Status: DISCONTINUED | OUTPATIENT
Start: 2018-07-05 | End: 2018-07-05 | Stop reason: HOSPADM

## 2018-07-05 RX ORDER — PROPOFOL 10 MG/ML
INJECTION, EMULSION INTRAVENOUS CONTINUOUS PRN
Status: DISCONTINUED | OUTPATIENT
Start: 2018-07-05 | End: 2018-07-05

## 2018-07-05 RX ORDER — ONDANSETRON 2 MG/ML
4 INJECTION INTRAMUSCULAR; INTRAVENOUS EVERY 30 MIN PRN
Status: DISCONTINUED | OUTPATIENT
Start: 2018-07-05 | End: 2018-07-05 | Stop reason: HOSPADM

## 2018-07-05 RX ORDER — ONDANSETRON 4 MG/1
4 TABLET, ORALLY DISINTEGRATING ORAL EVERY 30 MIN PRN
Status: DISCONTINUED | OUTPATIENT
Start: 2018-07-05 | End: 2018-07-05 | Stop reason: HOSPADM

## 2018-07-05 RX ORDER — OXYCODONE HYDROCHLORIDE 5 MG/1
5 TABLET ORAL
Status: CANCELLED | OUTPATIENT
Start: 2018-07-05

## 2018-07-05 RX ORDER — MEPERIDINE HYDROCHLORIDE 25 MG/ML
12.5 INJECTION INTRAMUSCULAR; INTRAVENOUS; SUBCUTANEOUS
Status: DISCONTINUED | OUTPATIENT
Start: 2018-07-05 | End: 2018-07-05 | Stop reason: HOSPADM

## 2018-07-05 RX ORDER — NALOXONE HYDROCHLORIDE 0.4 MG/ML
.1-.4 INJECTION, SOLUTION INTRAMUSCULAR; INTRAVENOUS; SUBCUTANEOUS
Status: DISCONTINUED | OUTPATIENT
Start: 2018-07-05 | End: 2018-07-05 | Stop reason: HOSPADM

## 2018-07-05 RX ORDER — METOPROLOL TARTRATE 1 MG/ML
1-2 INJECTION, SOLUTION INTRAVENOUS EVERY 5 MIN PRN
Status: DISCONTINUED | OUTPATIENT
Start: 2018-07-05 | End: 2018-07-05 | Stop reason: HOSPADM

## 2018-07-05 RX ORDER — ALBUTEROL SULFATE 0.83 MG/ML
2.5 SOLUTION RESPIRATORY (INHALATION) EVERY 4 HOURS PRN
Status: DISCONTINUED | OUTPATIENT
Start: 2018-07-05 | End: 2018-07-05 | Stop reason: HOSPADM

## 2018-07-05 RX ORDER — PROPOFOL 10 MG/ML
INJECTION, EMULSION INTRAVENOUS PRN
Status: DISCONTINUED | OUTPATIENT
Start: 2018-07-05 | End: 2018-07-05

## 2018-07-05 RX ORDER — OXYCODONE HYDROCHLORIDE 5 MG/1
5 TABLET ORAL
Qty: 6 TABLET | Refills: 0 | Status: SHIPPED | OUTPATIENT
Start: 2018-07-05 | End: 2021-11-29

## 2018-07-05 RX ORDER — HYDROMORPHONE HYDROCHLORIDE 1 MG/ML
.3-.5 INJECTION, SOLUTION INTRAMUSCULAR; INTRAVENOUS; SUBCUTANEOUS EVERY 10 MIN PRN
Status: DISCONTINUED | OUTPATIENT
Start: 2018-07-05 | End: 2018-07-05 | Stop reason: HOSPADM

## 2018-07-05 RX ORDER — MAGNESIUM HYDROXIDE 1200 MG/15ML
LIQUID ORAL PRN
Status: DISCONTINUED | OUTPATIENT
Start: 2018-07-05 | End: 2018-07-05 | Stop reason: HOSPADM

## 2018-07-05 RX ADMIN — PROPOFOL 75 MCG/KG/MIN: 10 INJECTION, EMULSION INTRAVENOUS at 09:00

## 2018-07-05 RX ADMIN — MIDAZOLAM 2 MG: 1 INJECTION INTRAMUSCULAR; INTRAVENOUS at 08:55

## 2018-07-05 RX ADMIN — CLINDAMYCIN PHOSPHATE 600 MG: 12 INJECTION, SOLUTION INTRAVENOUS at 08:55

## 2018-07-05 RX ADMIN — SODIUM CHLORIDE, POTASSIUM CHLORIDE, SODIUM LACTATE AND CALCIUM CHLORIDE: 600; 310; 30; 20 INJECTION, SOLUTION INTRAVENOUS at 08:55

## 2018-07-05 RX ADMIN — SODIUM BICARBONATE 23 ML: 84 INJECTION INTRAVENOUS at 09:20

## 2018-07-05 RX ADMIN — HYDROCODONE BITARTRATE AND ACETAMINOPHEN 50 ML: 500; 5 TABLET ORAL at 09:20

## 2018-07-05 RX ADMIN — PROPOFOL 20 MG: 10 INJECTION, EMULSION INTRAVENOUS at 09:07

## 2018-07-05 NOTE — IP AVS SNAPSHOT
MRN:[id number]                      After Visit Summary   7/5/2018    Liss Berg    MRN: [id number]           Thank you!     Thank you for choosing [facility] for your care. Our goal is always to provide you with excellent care. Hearing back from our patients is one way we can continue to improve our services. Please take a few minutes to complete the written survey that you may receive in the mail after you visit. If you would like to speak to someone directly about your visit please contact Patient Relations at [phone]. Thank you!          Patient Information     Date Of Birth          1965        About your hospital stay     You were admitted on:  July 5, 2018 You last received care in the:  [facility] PreOP/PostOP    You were discharged on:  July 5, 2018       Who to Call     For medical emergencies, please call 911.  For non-urgent questions about your medical care, please call your primary care provider or clinic, [phone]  For questions related to your surgery, please call your surgery clinic        Attending Provider     Provider Specialty    Miguel Angel Yost MD General Surgery       Primary Care Provider Office Phone # Fax #    Alina Soren Mahmood [phone]      Your next 10 appointments already scheduled     Jul 11, 2018  3:10 PM CDT   [facility] with ORACIO Wolf PT ([facility]  )    [id number] [place] [id number]   [phone]              Further instructions from your care team       GENERAL ANESTHESIA OR SEDATION ADULT DISCHARGE INSTRUCTIONS   SPECIAL PRECAUTIONS FOR 24 HOURS AFTER SURGERY    IT IS NOT UNUSUAL TO FEEL LIGHT-HEADED OR FAINT, UP TO 24 HOURS AFTER SURGERY OR WHILE TAKING PAIN MEDICATION.  IF YOU HAVE THESE SYMPTOMS; SIT FOR A FEW MINUTES BEFORE STANDING AND HAVE SOMEONE ASSIST YOU WHEN YOU GET UP TO WALK OR USE THE BATHROOM.    YOU SHOULD REST AND RELAX  FOR THE NEXT 24 HOURS AND YOU MUST MAKE ARRANGEMENTS TO HAVE SOMEONE STAY WITH YOU FOR AT LEAST 24 HOURS AFTER YOUR DISCHARGE.  AVOID HAZARDOUS AND STRENUOUS ACTIVITIES.  DO NOT MAKE IMPORTANT DECISIONS FOR 24 HOURS.    DO NOT DRIVE ANY VEHICLE OR OPERATE MECHANICAL EQUIPMENT FOR 24 HOURS FOLLOWING THE END OF YOUR SURGERY.  EVEN THOUGH YOU MAY FEEL NORMAL, YOUR REACTIONS MAY BE AFFECTED BY THE MEDICATION YOU HAVE RECEIVED.    DO NOT DRINK ALCOHOLIC BEVERAGES FOR 24 HOURS FOLLOWING YOUR SURGERY.    DRINK CLEAR LIQUIDS (APPLE JUICE, GINGER ALE, 7-UP, BROTH, ETC.).  PROGRESS TO YOUR REGULAR DIET AS YOU FEEL ABLE.    YOU MAY HAVE A DRY MOUTH, A SORE THROAT, MUSCLES ACHES OR TROUBLE SLEEPING.  THESE SHOULD GO AWAY AFTER 24 HOURS.    CALL YOUR DOCTOR FOR ANY OF THE FOLLOWING:  SIGNS OF INFECTION (FEVER, GROWING TENDERNESS AT THE SURGERY SITE, A LARGE AMOUNT OF DRAINAGE OR BLEEDING, SEVERE PAIN, FOUL-SMELLING DRAINAGE, REDNESS OR SWELLING.    IT HAS BEEN OVER 8 TO 10 HOURS SINCE SURGERY AND YOU ARE STILL NOT ABLE TO URINATE (PASS WATER.    REMOVAL OF YOUR PORT OR TUNNELED DIALYSIS CATHETER      WHEN YOU GET HOME:  NO DRIVING OR DRINKING ALCOHOL UNTIL TOMORROW.  YOU MAY STILL HAVE SIDE EFFECTS FROM THE MEDICINE YOU RECEIVED TODAY (YOU MAY FEEL DROWSY, UNSTEADY OR FORGETFUL).  YOU SHOULD HAVE AN ADULT WITH YOU FOR 24 HOURS.  YOU MAY GO BACK TO YOUR REGULAR DIET TODAY.  IF YOU TAKE ASPIRIN OR PLAVIX, YOU MAY BEGIN TAKING IT AGAIN TOMORROW.  YOU MAY RESTART ALL OTHER MEDICINES TODAY.  USE PAIN MEDICINE AS DIRECTED.  AVOID HEAVY LIFTING OR THE OVERUSE OF YOUR SHOULDER FOR THREE DAYS.    PORT SITE AND BANDAGE CARE:  KEEP THE WOUND CLEAN AND DRY FOR THREE DAYS.  COVER IT WITH PLASTIC BEFORE TAKING A SHOWER.  CHANGE THE BANDAGE IF IT GETS WET OR DIRTY.  USE BACITRACIN (ANTIBIOTIC CREAM) AND CLEAN GAUZE.  AFTER THREE DAYS, YOU MAY USE BAND-AIDS UNTIL THE WOUND HAS HEALED.  IF YOU HAVE OOZING OR BLEEDING FROM THE PORT SITE OR  "CATHETER (TUBE) SITE:  PUT DIRECT PRESSURE ON THE WOUND FOR 5 TO 10 MINUTES WITH A GAUZE PAD.  IF YOU STILL HAVE BLEEDING AFTER 10 MINUTES, CALL YOUR SURGEON.  IF YOU ARE BLEEDING A LOT AND CAN'T CONTROL IT WITH DIRECT PRESSURE, CALL 911.    CALL YOUR SURGEON IF YOU HAVE:  SWELLING IN YOUR NECK.    SIGNS OF INFECTION:  FEVER OVER 100 DEGREES (UNDER THE TONGUE); THE WOUND IS RED, TENDER OR DRAINING.    Pending Results     No orders found from 7/3/2018 to 7/6/2018.            Admission Information     Date & Time Provider Department Dept. Phone    7/5/2018 Miguel Angel Yost MD [facility] PreOP/PostOP [phone]      Your Vitals Were     Blood Pressure Temperature Respirations Height Weight Last Period    158/98 97  F (36.1  C) (Temporal) 17 1.626 m (5' 4\") 108 kg (238 lb) 04/05/2016    Pulse Oximetry BMI (Body Mass Index)                98% 40.85 kg/m2          Care EveryWhere ID     This is your Care EveryWhere ID. This could be used by other organizations to access your [place] medical records  [id number]        Equal Access to Services     VALERIA MERLOS AH: Hadanup Lutz, oksana philip, papa fuentes, delbert soto. So [facility] [phone].    ATENCIÓN: Si habla español, tiene a costello disposición servicios gratuitos de asistencia lingüística. [facility] [phone].    We comply with applicable federal civil rights laws and [place] laws. We do not discriminate on the basis of race, color, national origin, age, disability, sex, sexual orientation, or gender identity.               Review of your medicines      START taking        Dose / Directions    oxyCODONE IR 5 MG tablet   Commonly known as:  ROXICODONE   Used for:  Colorectal carcinoma (H)        Dose:  5 mg   Take 1 tablet (5 mg) by mouth every 3 hours as needed for pain or other (Moderate to Severe)   Quantity:  6 tablet   Refills:  0         CONTINUE these medicines which have NOT CHANGED        Dose " / Directions    albuterol 108 (90 Base) MCG/ACT Inhaler   Commonly known as:  PROAIR HFA/PROVENTIL HFA/VENTOLIN HFA   Used for:  Mild intermittent asthma without complication        Dose:  2 puff   Inhale 2 puffs into the lungs every 6 hours as needed for shortness of breath / dyspnea or wheezing   Quantity:  1 Inhaler   Refills:  1       dexamethasone 4 MG/ML injection   Commonly known as:  DECADRON   Used for:  Subacromial impingement of left shoulder        Dose:  4 mg   Inject 1 mL (4 mg) as directed once for 1 dose   Quantity:  1 mL   Refills:  0       ferrous sulfate 325 (65 Fe) MG tablet   Commonly known as:  IRON   Used for:  Iron deficiency anemia, unspecified iron deficiency anemia type        Dose:  325 mg   Take 1 tablet (325 mg) by mouth daily (with breakfast)   Quantity:  90 tablet   Refills:  0       losartan 50 MG tablet   Commonly known as:  COZAAR   Used for:  Benign essential hypertension        Dose:  50 mg   Take 1 tablet (50 mg) by mouth daily   Quantity:  90 tablet   Refills:  1       metoprolol tartrate 100 MG tablet   Commonly known as:  LOPRESSOR   Used for:  Benign essential hypertension        Dose:  100 mg   Take 1 tablet (100 mg) by mouth 2 times daily   Quantity:  180 tablet   Refills:  1       traZODone 50 MG tablet   Commonly known as:  DESYREL   Used for:  Persistent disorder of initiating or maintaining sleep        Dose:   mg   Take 0.5-2 tablets ( mg) by mouth nightly as needed for sleep   Quantity:  90 tablet   Refills:  1            Where to get your medicines      Some of these will need a paper prescription and others can be bought over the counter. Ask your nurse if you have questions.     Bring a paper prescription for each of these medications     oxyCODONE IR 5 MG tablet                Protect others around you: Learn how to safely use, store and throw away your medicines at www.disposemymeds.org.        Information about OPIOIDS     PRESCRIPTION OPIOIDS: WHAT  YOU NEED TO KNOW   We gave you an opioid (narcotic) pain medicine. It is important to manage your pain, but opioids are not always the best choice. You should first try all the other options your care team gave you. Take this medicine for as short a time (and as few doses) as possible.     These medicines have risks:    DO NOT drive when on new or higher doses of pain medicine. These medicines can affect your alertness and reaction times, and you could be arrested for driving under the influence (DUI). If you need to use opioids long-term, talk to your care team about driving.    DO NOT operate heave machinery    DO NOT do any other dangerous activities while taking these medicines.     DO NOT drink any alcohol while taking these medicines.      If the opioid prescribed includes acetaminophen, DO NOT take with any other medicines that contain acetaminophen. Read all labels carefully. Look for the word  acetaminophen  or  Tylenol.  Ask your pharmacist if you have questions or are unsure.    You can get addicted to pain medicines, especially if you have a history of addiction (chemical, alcohol or substance dependence). Talk to your care team about ways to reduce this risk.    Store your pills in a secure place, locked if possible. We will not replace any lost or stolen medicine. If you don t finish your medicine, please throw away (dispose) as directed by your pharmacist. The [place] Pollution Control Agency has more information about safe disposal: https://www.pca.[facility].[place].us/living-green/managing-unwanted-medications.     All opioids tend to cause constipation. Drink plenty of water and eat foods that have a lot of fiber, such as fruits, vegetables, prune juice, apple juice and high-fiber cereal. Take a laxative (Miralax, milk of magnesia, Colace, Senna) if you don t move your bowels at least every other day.              Medication List: This is a list of all your medications and when to take them. Check marks  below indicate your daily home schedule. Keep this list as a reference.      Medications           Morning Afternoon Evening Bedtime As Needed    albuterol 108 (90 Base) MCG/ACT Inhaler   Commonly known as:  PROAIR HFA/PROVENTIL HFA/VENTOLIN HFA   Inhale 2 puffs into the lungs every 6 hours as needed for shortness of breath / dyspnea or wheezing                                dexamethasone 4 MG/ML injection   Commonly known as:  DECADRON   Inject 1 mL (4 mg) as directed once for 1 dose                                ferrous sulfate 325 (65 Fe) MG tablet   Commonly known as:  IRON   Take 1 tablet (325 mg) by mouth daily (with breakfast)                                losartan 50 MG tablet   Commonly known as:  COZAAR   Take 1 tablet (50 mg) by mouth daily                                metoprolol tartrate 100 MG tablet   Commonly known as:  LOPRESSOR   Take 1 tablet (100 mg) by mouth 2 times daily                                oxyCODONE IR 5 MG tablet   Commonly known as:  ROXICODONE   Take 1 tablet (5 mg) by mouth every 3 hours as needed for pain or other (Moderate to Severe)                                traZODone 50 MG tablet   Commonly known as:  DESYREL   Take 0.5-2 tablets ( mg) by mouth nightly as needed for sleep

## 2018-07-05 NOTE — OP NOTE
"General Surgery Operative Note    Pre-operative diagnosis:  colon Cancer, no longer in need of central venous access   Post-operative diagnosis:  same   Procedure: Port-A-Cath removal   Surgeon: Miguel Angel Yost MD   Assistant(s): NONE   Anesthesia: Local with MAC    Estimated blood loss: 2 cc's   Drains placed: None   Complications:  None   Findings:     Specimens: * No specimens in log *    Indications: This 53-year-old female has undergone surgical resection of a colon cancer and has undergone chemotherapy.  She no longer needs her port and, it has become dysfunctional.  She requests removal.    Description: Patient brought the operating room, placed supine on the table and after sterile prep and drape a pause is performed.  Local anesthetic is placed and once a block is achieved, the old scar is excised.  The capsule around the port is incised and the port is dislodged.  A suture is placed around the scar sheath surrounding the catheter and as the catheter is removed, the suture is tied down preventing both bleeding and air embolism.  The cavity was then closed with 3-0 and 4-0 Vicryl followed by Steri-Strips.  She is then returned to the recovery room in excellent condition with all sponge and needle counts correct having tolerated the procedure well.  Examination of the port shows multiple linear \"lacerations\" about midway towards the tip of the catheter.  The port appeared to be otherwise completely intact.    Miguel Angel Yost MD  "

## 2018-07-05 NOTE — IP AVS SNAPSHOT
[facility] PreOP/PostOP    [address]    [facility] [address]    Phone:  [phone]    Fax:  [phone]                                       After Visit Summary   7/5/2018    Liss Berg    MRN: [id number]           After Visit Summary Signature Page     I have received my discharge instructions, and my questions have been answered. I have discussed any challenges I see with this plan with the nurse or doctor.    ..........................................................................................................................................  Patient/Patient Representative Signature      ..........................................................................................................................................  Patient Representative Print Name and Relationship to Patient    ..................................................               ................................................  Date                                            Time    ..........................................................................................................................................  Reviewed by Signature/Title    ...................................................              ..............................................  Date                                                            Time

## 2018-07-05 NOTE — ANESTHESIA POSTPROCEDURE EVALUATION
Patient: Liss Berg    Procedure(s):  power Port a catheter Removal  - Wound Class: I-Clean    Diagnosis:Cancer  Diagnosis Additional Information: colon Cancer, no longer in need of central venous access     Anesthesia Type:  MAC    Note:  Anesthesia Post Evaluation    Patient location during evaluation: PACU  Patient participation: Able to fully participate in evaluation  Level of consciousness: awake  Pain management: adequate  Airway patency: patent  Cardiovascular status: acceptable  Respiratory status: acceptable  Hydration status: acceptable  PONV: controlled     Anesthetic complications: None          Last vitals:  Vitals:    07/05/18 0714 07/05/18 0927 07/05/18 1000   BP: (!) 187/91 (!) 158/98 150/87   Resp: 16 17 17   Temp: 97.7  F (36.5  C) 97  F (36.1  C) 97.6  F (36.4  C)   SpO2: 95% 98% 99%         Electronically Signed By: Aman Portillo DO  July 5, 2018  11:36 AM

## 2018-07-05 NOTE — ANESTHESIA CARE TRANSFER NOTE
Patient: Liss Berg    Procedure(s):  power Port a catheter Removal  - Wound Class: I-Clean    Diagnosis: Cancer  Diagnosis Additional Information: No value filed.    Anesthesia Type:   MAC     Note:  Airway :Room Air  Patient transferred to:Phase II  Comments: Did wellHandoff Report: Identifed the Patient, Identified the Reponsible Provider, Reviewed the pertinent medical history, Discussed the surgical course, Reviewed Intra-OP anesthesia mangement and issues during anesthesia, Set expectations for post-procedure period and Allowed opportunity for questions and acknowledgement of understanding      Vitals: (Last set prior to Anesthesia Care Transfer)    CRNA VITALS  7/5/2018 0856 - 7/5/2018 0929      7/5/2018             Pulse: 64    SpO2: 99 %                Electronically Signed By: MUSHTAQ aDnielson CRNA  July 5, 2018  9:29 AM

## 2018-07-05 NOTE — DISCHARGE INSTRUCTIONS
GENERAL ANESTHESIA OR SEDATION ADULT DISCHARGE INSTRUCTIONS   SPECIAL PRECAUTIONS FOR 24 HOURS AFTER SURGERY    IT IS NOT UNUSUAL TO FEEL LIGHT-HEADED OR FAINT, UP TO 24 HOURS AFTER SURGERY OR WHILE TAKING PAIN MEDICATION.  IF YOU HAVE THESE SYMPTOMS; SIT FOR A FEW MINUTES BEFORE STANDING AND HAVE SOMEONE ASSIST YOU WHEN YOU GET UP TO WALK OR USE THE BATHROOM.    YOU SHOULD REST AND RELAX FOR THE NEXT 24 HOURS AND YOU MUST MAKE ARRANGEMENTS TO HAVE SOMEONE STAY WITH YOU FOR AT LEAST 24 HOURS AFTER YOUR DISCHARGE.  AVOID HAZARDOUS AND STRENUOUS ACTIVITIES.  DO NOT MAKE IMPORTANT DECISIONS FOR 24 HOURS.    DO NOT DRIVE ANY VEHICLE OR OPERATE MECHANICAL EQUIPMENT FOR 24 HOURS FOLLOWING THE END OF YOUR SURGERY.  EVEN THOUGH YOU MAY FEEL NORMAL, YOUR REACTIONS MAY BE AFFECTED BY THE MEDICATION YOU HAVE RECEIVED.    DO NOT DRINK ALCOHOLIC BEVERAGES FOR 24 HOURS FOLLOWING YOUR SURGERY.    DRINK CLEAR LIQUIDS (APPLE JUICE, GINGER ALE, 7-UP, BROTH, ETC.).  PROGRESS TO YOUR REGULAR DIET AS YOU FEEL ABLE.    YOU MAY HAVE A DRY MOUTH, A SORE THROAT, MUSCLES ACHES OR TROUBLE SLEEPING.  THESE SHOULD GO AWAY AFTER 24 HOURS.    CALL YOUR DOCTOR FOR ANY OF THE FOLLOWING:  SIGNS OF INFECTION (FEVER, GROWING TENDERNESS AT THE SURGERY SITE, A LARGE AMOUNT OF DRAINAGE OR BLEEDING, SEVERE PAIN, FOUL-SMELLING DRAINAGE, REDNESS OR SWELLING.    IT HAS BEEN OVER 8 TO 10 HOURS SINCE SURGERY AND YOU ARE STILL NOT ABLE TO URINATE (PASS WATER.    REMOVAL OF YOUR PORT OR TUNNELED DIALYSIS CATHETER      WHEN YOU GET HOME:  NO DRIVING OR DRINKING ALCOHOL UNTIL TOMORROW.  YOU MAY STILL HAVE SIDE EFFECTS FROM THE MEDICINE YOU RECEIVED TODAY (YOU MAY FEEL DROWSY, UNSTEADY OR FORGETFUL).  YOU SHOULD HAVE AN ADULT WITH YOU FOR 24 HOURS.  YOU MAY GO BACK TO YOUR REGULAR DIET TODAY.  IF YOU TAKE ASPIRIN OR PLAVIX, YOU MAY BEGIN TAKING IT AGAIN TOMORROW.  YOU MAY RESTART ALL OTHER MEDICINES TODAY.  USE PAIN MEDICINE AS DIRECTED.  AVOID HEAVY LIFTING OR THE  OVERUSE OF YOUR SHOULDER FOR THREE DAYS.    PORT SITE AND BANDAGE CARE:  KEEP THE WOUND CLEAN AND DRY FOR THREE DAYS.  COVER IT WITH PLASTIC BEFORE TAKING A SHOWER.  CHANGE THE BANDAGE IF IT GETS WET OR DIRTY.  USE BACITRACIN (ANTIBIOTIC CREAM) AND CLEAN GAUZE.  AFTER THREE DAYS, YOU MAY USE BAND-AIDS UNTIL THE WOUND HAS HEALED.  IF YOU HAVE OOZING OR BLEEDING FROM THE PORT SITE OR CATHETER (TUBE) SITE:  PUT DIRECT PRESSURE ON THE WOUND FOR 5 TO 10 MINUTES WITH A GAUZE PAD.  IF YOU STILL HAVE BLEEDING AFTER 10 MINUTES, CALL YOUR SURGEON.  IF YOU ARE BLEEDING A LOT AND CAN'T CONTROL IT WITH DIRECT PRESSURE, CALL 911.    CALL YOUR SURGEON IF YOU HAVE:  SWELLING IN YOUR NECK.    SIGNS OF INFECTION:  FEVER OVER 100 DEGREES (UNDER THE TONGUE); THE WOUND IS RED, TENDER OR DRAINING.

## 2018-07-05 NOTE — ANESTHESIA PREPROCEDURE EVALUATION
Anesthesia Evaluation     .             ROS/MED HX    ENT/Pulmonary:     (+)asthma , . .    Neurologic:  - neg neurologic ROS     Cardiovascular: Comment: Hypertrophic cardiomyopathy    (+) hypertension----. : . . . :. valvular problems/murmurs Murmur:.       METS/Exercise Tolerance:     Hematologic:  - neg hematologic  ROS       Musculoskeletal:  - neg musculoskeletal ROS       GI/Hepatic:  - neg GI/hepatic ROS       Renal/Genitourinary:  - ROS Renal section negative       Endo: Comment: .Body mass index is 40.85 kg/(m^2).      (+) Obesity, .      Psychiatric:  - neg psychiatric ROS       Infectious Disease:  - neg infectious disease ROS       Malignancy:   (+) Malignancy History of GI          Other: Comment: .Lab Test        05/24/18 08/22/16 05/24/16      --          05/09/16                       1431                            1455           --           2150          WBC          6.9          4.3          7.8            < >        8.5           HGB          12.7         9.2*         8.1*           < >        8.0*          MCV          88           74.3         68*            < >        66*           PLT          212          112*         349            < >        327           INR           --           --           --           --          1.04           < > = values in this interval not displayed.                  Lab Test        05/24/18 08/22/16 05/24/16 05/17/16                       1431                            1455          0718          NA           143          140          140          139           POTASSIUM    4.0          3.6          3.6          3.9           CHLORIDE     108          107          106          110*          CO2          30            --          25           22            BUN          13           12           11           4*            CR           0.83         0.84         0.61         0.58          ANIONGAP     5             --          9             7             RENETTA          8.6          9.1          8.6          8.1*          GLC          93           101*         110*         127*                              Physical Exam  Normal systems: cardiovascular and pulmonary    Airway   Mallampati: III    Dental     Cardiovascular   Rhythm and rate: regular and normal      Pulmonary    breath sounds clear to auscultation                    Anesthesia Plan      History & Physical Review  History and physical reviewed and following examination; no interval change.    ASA Status:  3 .        Plan for MAC   PONV prophylaxis:  Ondansetron (or other 5HT-3)       Postoperative Care  Postoperative pain management:  IV analgesics.      Consents  Anesthetic plan, risks, benefits and alternatives discussed with:  Patient or representative..                          .

## 2018-09-12 ENCOUNTER — DOCUMENTATION ONLY (OUTPATIENT)
Dept: CARDIOLOGY | Facility: CLINIC | Age: 53
End: 2018-09-12

## 2018-09-12 NOTE — PROGRESS NOTES
Overdue orders for follow up with Cardiologist and labs from 9/2017. Pt last seen in our clinic in 8/2016. Pt has been mailed 2 letters already regarding needing to call to schedule follow up in order to get further medication refills. Will ask scheduling to call her to arrange follow up. Orders extended. SAIDA Garvey 11:25 AM 09/12/18

## 2018-09-28 NOTE — PROGRESS NOTES
[facility]    System    Physical Exam    General     ROS    Assessment/Plan:    DISCHARGE REPORT    Progress reporting period is from 6/22/18 to 6/22/18.       SUBJECTIVE  Subjective changes noted by patient:  Pt has not returned to PT. Current subjective findings unknown.  =   Current Pain level: 5/10.      Initial Pain level: 10/10.   Changes in function:  None  Adverse reaction to treatment or activity: None    OBJECTIVE  Changes noted in objective findings:  Patient has failed to return to therapy so current objective findings are unknown.  Objective: See chart     ASSESSMENT/PLAN  Updated problem list and treatment plan: Diagnosis 1:  Left shoulder pain   STG/LTGs have been met or progress has been made towards goals:  None  Assessment of Progress: The patient has not returned to therapy. Current status is unknown.  Self Management Plans:  Patient is independent in a home treatment program.  Liss continues to require the following intervention to meet STG and LTG's:  PT intervention is no longer required to meet STG/LTG.    Recommendations:  This patient is ready to be discharged from therapy and continue their home treatment program.    Please refer to the daily flowsheet for treatment today, total treatment time and time spent performing 1:1 timed codes.

## 2018-11-29 ENCOUNTER — TELEPHONE (OUTPATIENT)
Dept: INTERNAL MEDICINE | Facility: CLINIC | Age: 53
End: 2018-11-29

## 2018-11-29 NOTE — LETTER
[facility]  [address], [address]  [place], [place]  [address]                                            TEL:[phone]  FAX:[phone]      Liss Berg  [id number]  [facility] [id number] December 17, 2018    Dear Liss,    We sent you a letter a couple of weeks ago informing you of health maintenance that is due. We hope that you received it. This letter is just a follow up to remind you to schedule an appointment.            Sincerely,      Virginia Yi C.N.P.

## 2018-11-29 NOTE — TELEPHONE ENCOUNTER
Panel Management Review      Patient has the following on her problem list:     Asthma review   No flowsheet data found.   1. Is Asthma diagnosis on the Problem List? Yes    2. Is Asthma listed on Health Maintenance? No   3. Patient is due for:  none    Hypertension   Last three blood pressure readings:  BP Readings from Last 3 Encounters:   07/05/18 150/87   07/03/18 144/84   06/13/18 138/88     Blood pressure: Failed    HTN Guidelines:  Age 18-59 BP range:  Less than 140/90  Age 60-85 with Diabetes:  Less than 140/90  Age 60-85 without Diabetes:  less than 150/90      Composite cancer screening  Chart review shows that this patient is due/due soon for the following Pap Smear and Mammogram  Summary:    Patient is due/failing the following:   BP CHECK, MAMMOGRAM and PAP    Action needed:   Patient needs office visit for Pap and HTN check. and Patient needs referral/order: Mammogram    Type of outreach:    Sent letter.    Questions for provider review:    None                                                                                                                                    DAYNA ROJAS CMA       Chart routed to Care Team .

## 2018-11-29 NOTE — LETTER
[facility]  [address], [address]  [place], [place]  [address]                                            TEL:[phone]  FAX:[phone]      Liss Berg  [address]  [facility] [id number] November 29, 2018    Dear Liss,    At [facility] we care about your health and well-being.  A review of your chart has indicated that you are due for a mammogram,Pap and Blood pressure check..  Please contact us at [phone] to schedule an appointment.    If you have already had one or all of the above screening tests at another facility, please call us to update your chart.     Sincerely,      Virginia Yi C.N.P.

## 2019-03-07 DIAGNOSIS — I10 BENIGN ESSENTIAL HYPERTENSION: ICD-10-CM

## 2019-03-08 NOTE — TELEPHONE ENCOUNTER
"Requested Prescriptions   Pending Prescriptions Disp Refills     losartan (COZAAR) 50 MG tablet [Pharmacy Med Name: Losartan Potassium Oral Tablet 50 MG] 90 tablet 0    Last Written Prescription Date:  05/24/2018  Last Fill Quantity: 90,  # refills: 1   Last office visit: 7/3/2018 with prescribing provider:     Future Office Visit:   Next 5 appointments (look out 90 days)    Mar 13, 2019  1:00 PM CDT  SHORT with Virginia Yi NP  [facility] ([facility]) [address]  [facility] [address]  [phone]        Sig: Take 1 tablet (50 mg) by mouth daily    Angiotensin-II Receptors Failed - 3/7/2019  3:19 PM       Failed - Blood pressure under 140/90 in past 12 months    BP Readings from Last 3 Encounters:   07/05/18 150/87   07/03/18 144/84   06/13/18 138/88                Passed - Recent (12 mo) or future (30 days) visit within the authorizing provider's specialty    Patient had office visit in the last 12 months or has a visit in the next 30 days with authorizing provider or within the authorizing provider's specialty.  See \"Patient Info\" tab in inbasket, or \"Choose Columns\" in Meds & Orders section of the refill encounter.             Passed - Medication is active on med list       Passed - Patient is age 18 or older       Passed - No active pregnancy on record       Passed - Normal serum creatinine on file in past 12 months    Recent Labs   Lab Test 07/05/18  0808   CR 0.69            Passed - Normal serum potassium on file in past 12 months    Recent Labs   Lab Test 07/05/18  0808   POTASSIUM 3.8                   Passed - No positive pregnancy test in past 12 months        metoprolol tartrate (LOPRESSOR) 100 MG tablet [Pharmacy Med Name: Metoprolol Tartrate Oral Tablet 100 MG] 180 tablet 0    Last Written Prescription Date:  05/24/2018  Last Fill Quantity: 180,  # refills: 1   Last office visit: 7/3/2018 with prescribing provider:     Future Office Visit: " "  Next 5 appointments (look out 90 days)    Mar 13, 2019  1:00 PM CDT  SHORT with Virginia Yi NP  [facility] ([facility]) [address]  [facility] [address] [phone]        Sig: Take 1 tablet (100 mg) by mouth 2 times daily    Beta-Blockers Protocol Failed - 3/7/2019  3:19 PM       Failed - Blood pressure under 140/90 in past 12 months    BP Readings from Last 3 Encounters:   07/05/18 150/87   07/03/18 144/84   06/13/18 138/88                Passed - Patient is age 6 or older       Passed - Recent (12 mo) or future (30 days) visit within the authorizing provider's specialty    Patient had office visit in the last 12 months or has a visit in the next 30 days with authorizing provider or within the authorizing provider's specialty.  See \"Patient Info\" tab in inbasket, or \"Choose Columns\" in Meds & Orders section of the refill encounter.             Passed - Medication is active on med list        "

## 2019-03-11 RX ORDER — LOSARTAN POTASSIUM 50 MG/1
50 TABLET ORAL DAILY
Qty: 90 TABLET | Refills: 0 | Status: SHIPPED | OUTPATIENT
Start: 2019-03-11 | End: 2019-03-13

## 2019-03-11 RX ORDER — METOPROLOL TARTRATE 100 MG
100 TABLET ORAL 2 TIMES DAILY
Qty: 180 TABLET | Refills: 0 | Status: SHIPPED | OUTPATIENT
Start: 2019-03-11 | End: 2019-03-13

## 2019-03-11 NOTE — TELEPHONE ENCOUNTER
Routing refill request to provider for review/approval because:  Blood pressures    Next 5 appointments (look out 90 days)    Mar 13, 2019  1:00 PM CDT  SHORT with Virginia Yi NP  [facility] ([facility]) [address]  [facility] [address]  [phone]

## 2019-03-13 ENCOUNTER — OFFICE VISIT (OUTPATIENT)
Dept: INTERNAL MEDICINE | Facility: CLINIC | Age: 54
End: 2019-03-13
Payer: COMMERCIAL

## 2019-03-13 VITALS
SYSTOLIC BLOOD PRESSURE: 170 MMHG | DIASTOLIC BLOOD PRESSURE: 86 MMHG | BODY MASS INDEX: 41.15 KG/M2 | RESPIRATION RATE: 16 BRPM | TEMPERATURE: 98.2 F | HEIGHT: 64 IN | HEART RATE: 90 BPM | WEIGHT: 241 LBS | OXYGEN SATURATION: 95 %

## 2019-03-13 DIAGNOSIS — I10 BENIGN ESSENTIAL HYPERTENSION: ICD-10-CM

## 2019-03-13 DIAGNOSIS — Z12.31 ENCOUNTER FOR SCREENING MAMMOGRAM FOR BREAST CANCER: Primary | ICD-10-CM

## 2019-03-13 LAB
ERYTHROCYTE [DISTWIDTH] IN BLOOD BY AUTOMATED COUNT: 13.5 % (ref 10–15)
HCT VFR BLD AUTO: 39.7 % (ref 35–47)
HGB BLD-MCNC: 13.5 G/DL (ref 11.7–15.7)
MCH RBC QN AUTO: 29.3 PG (ref 26.5–33)
MCHC RBC AUTO-ENTMCNC: 34 G/DL (ref 31.5–36.5)
MCV RBC AUTO: 86 FL (ref 78–100)
PLATELET # BLD AUTO: 217 10E9/L (ref 150–450)
RBC # BLD AUTO: 4.6 10E12/L (ref 3.8–5.2)
WBC # BLD AUTO: 7.1 10E9/L (ref 4–11)

## 2019-03-13 PROCEDURE — 80061 LIPID PANEL: CPT | Performed by: NURSE PRACTITIONER

## 2019-03-13 PROCEDURE — 80048 BASIC METABOLIC PNL TOTAL CA: CPT | Performed by: NURSE PRACTITIONER

## 2019-03-13 PROCEDURE — 85027 COMPLETE CBC AUTOMATED: CPT | Performed by: NURSE PRACTITIONER

## 2019-03-13 PROCEDURE — 36415 COLL VENOUS BLD VENIPUNCTURE: CPT | Performed by: NURSE PRACTITIONER

## 2019-03-13 PROCEDURE — 99213 OFFICE O/P EST LOW 20 MIN: CPT | Performed by: NURSE PRACTITIONER

## 2019-03-13 PROCEDURE — 82043 UR ALBUMIN QUANTITATIVE: CPT | Performed by: NURSE PRACTITIONER

## 2019-03-13 RX ORDER — LOSARTAN POTASSIUM 50 MG/1
50 TABLET ORAL DAILY
Qty: 90 TABLET | Refills: 3 | Status: SHIPPED | OUTPATIENT
Start: 2019-03-13 | End: 2020-05-07

## 2019-03-13 RX ORDER — METOPROLOL TARTRATE 100 MG
100 TABLET ORAL 2 TIMES DAILY
Qty: 180 TABLET | Refills: 3 | Status: SHIPPED | OUTPATIENT
Start: 2019-03-13 | End: 2020-05-07

## 2019-03-13 ASSESSMENT — MIFFLIN-ST. JEOR: SCORE: 1678.17

## 2019-03-13 NOTE — LETTER
March 14, 2019      Liss Berg  [id number]  [facility] [address]        Dear ,    We are writing to inform you of your test results.    Your cholesterol and glucose levels are borderline. I would like you to work harder on your diet for now and continue your current medications. You will need a follow up fasting cholesterol in 6 months. The rest of your results were in normal range.    Virginia Yi CNP    Resulted Orders   CBC with platelets   Result Value Ref Range    WBC 7.1 4.0 - 11.0 10e9/L    RBC Count 4.60 3.8 - 5.2 10e12/L    Hemoglobin 13.5 11.7 - 15.7 g/dL    Hematocrit 39.7 35.0 - 47.0 %    MCV 86 78 - 100 fl    MCH 29.3 26.5 - 33.0 pg    MCHC 34.0 31.5 - 36.5 g/dL    RDW 13.5 10.0 - 15.0 %    Platelet Count 217 150 - 450 10e9/L   Basic metabolic panel   Result Value Ref Range    Sodium 142 133 - 144 mmol/L    Potassium 3.6 3.4 - 5.3 mmol/L    Chloride 110 (H) 94 - 109 mmol/L    Carbon Dioxide 23 20 - 32 mmol/L    Anion Gap 9 3 - 14 mmol/L    Glucose 103 (H) 70 - 99 mg/dL      Comment:      Non Fasting    Urea Nitrogen 10 7 - 30 mg/dL    Creatinine 0.85 0.52 - 1.04 mg/dL    GFR Estimate 78 >60 mL/min/[1.73_m2]      Comment:      Non  GFR Calc  Starting 12/18/2018, serum creatinine based estimated GFR (eGFR) will be   calculated using the Chronic Kidney Disease Epidemiology Collaboration   (CKD-EPI) equation.      GFR Estimate If Black >90 >60 mL/min/[1.73_m2]      Comment:       GFR Calc  Starting 12/18/2018, serum creatinine based estimated GFR (eGFR) will be   calculated using the Chronic Kidney Disease Epidemiology Collaboration   (CKD-EPI) equation.      Calcium 9.0 8.5 - 10.1 mg/dL   Albumin Random Urine Quantitative with Creat Ratio   Result Value Ref Range    Creatinine Urine 306 mg/dL    Albumin Urine mg/L 80 mg/L    Albumin Urine mg/g Cr 25.98 (H) 0 - 25 mg/g Cr   Lipid panel reflex to direct LDL Non-fasting   Result Value Ref  Range    Cholesterol 214 (H) <200 mg/dL      Comment:      Desirable:       <200 mg/dl    Triglycerides 136 <150 mg/dL      Comment:      Non Fasting    HDL Cholesterol 55 >49 mg/dL    LDL Cholesterol Calculated 132 (H) <100 mg/dL      Comment:      Above desirable:  100-129 mg/dl  Borderline High:  130-159 mg/dL  High:             160-189 mg/dL  Very high:       >189 mg/dl      Non HDL Cholesterol 159 (H) <130 mg/dL      Comment:      Above Desirable:  130-159 mg/dl  Borderline high:  160-189 mg/dl  High:             190-219 mg/dl  Very high:       >219 mg/dl         If you have any questions or concerns, please call the clinic at the number listed above.       Sincerely,        Virginia Yi NP

## 2019-03-13 NOTE — NURSING NOTE
Patient here for medication renew and patient states that she has not had her periods in over 2 years, last week she had one for 4-5 days that was very light

## 2019-03-13 NOTE — PROGRESS NOTES
SUBJECTIVE:   Liss Berg is a 54 year old female who presents to clinic today for the following health issues:      Hypertension Follow-up      Outpatient blood pressures are being checked at work.  Results are 165/70.    Low Salt Diet: no added salt      Amount of exercise or physical activity: 4-5 days/week for an average of greater than 60 minutes    Problems taking medications regularly: No    Medication side effects: none    Diet: regular (no restrictions)low fiber            Problem list and histories reviewed & adjusted, as indicated.  Additional history: as documented    Patient Active Problem List   Diagnosis     Cellulitis of leg     Cellulitis     Abdominal pain     Colorectal carcinoma (H)     Benign essential hypertension     Mild intermittent asthma without complication     Chronic left shoulder pain     Left shoulder pain     Morbid obesity (H)     Hypertrophic cardiomyopathy (H)     Past Surgical History:   Procedure Laterality Date     COLECTOMY RIGHT Right 5/11/2016    Procedure: COLECTOMY RIGHT;  Surgeon: Miguel Angel Yost MD;  Location: [facility] OR     INSERT PORT VASCULAR ACCESS Left 7/5/2016    Procedure: INSERT PORT VASCULAR ACCESS;  Surgeon: Juan Carlos Lopez MD;  Location: [facility] OR     LAPAROSCOPIC ASSISTED COLECTOMY Right 5/11/2016    Procedure: LAPAROSCOPIC ASSISTED COLECTOMY;  Surgeon: Miguel Angel Yost MD;  Location: [facility] OR     REMOVE PORT VASCULAR ACCESS N/A 7/5/2018    Procedure: REMOVE PORT VASCULAR ACCESS;  Port-a-cath removal;  Surgeon: Miguel Angel Yost MD;  Location: [facility] OR       Social History     Tobacco Use     Smoking status: Never Smoker     Smokeless tobacco: Never Used   Substance Use Topics     Alcohol use: Yes     Alcohol/week: 0.0 oz     Comment: less that once per month - very rare     Family History   Problem Relation Age of Onset     Bronchitis Father          Current Outpatient Medications   Medication Sig Dispense Refill     albuterol (PROAIR HFA, PROVENTIL HFA,  "VENTOLIN HFA) 108 (90 BASE) MCG/ACT inhaler Inhale 2 puffs into the lungs every 6 hours as needed for shortness of breath / dyspnea or wheezing 1 Inhaler 1     ferrous sulfate (IRON) 325 (65 FE) MG tablet Take 1 tablet (325 mg) by mouth daily (with breakfast) 90 tablet 0     losartan (COZAAR) 50 MG tablet Take 1 tablet (50 mg) by mouth daily 90 tablet 3     metoprolol tartrate (LOPRESSOR) 100 MG tablet Take 1 tablet (100 mg) by mouth 2 times daily 180 tablet 3     oxyCODONE IR (ROXICODONE) 5 MG tablet Take 1 tablet (5 mg) by mouth every 3 hours as needed for pain or other (Moderate to Severe) 6 tablet 0     traZODone (DESYREL) 50 MG tablet Take 0.5-2 tablets ( mg) by mouth nightly as needed for sleep 90 tablet 1     dexamethasone (DECADRON) 4 MG/ML injection Inject 1 mL (4 mg) as directed once for 1 dose 1 mL 0     BP Readings from Last 3 Encounters:   03/13/19 170/86   07/05/18 150/87   07/03/18 144/84    Wt Readings from Last 3 Encounters:   03/13/19 109.3 kg (241 lb)   07/05/18 108 kg (238 lb)   07/03/18 105.5 kg (232 lb 8 oz)                    Reviewed and updated as needed this visit by clinical staff  Tobacco  Allergies  Meds  Med Hx  Surg Hx  Fam Hx  Soc Hx      Reviewed and updated as needed this visit by Provider         ROS:  CONSTITUTIONAL: NEGATIVE for fever, chills, change in weight  ENT/MOUTH: NEGATIVE for ear, mouth and throat problems  RESP: NEGATIVE for significant cough or SOB  CV: NEGATIVE for chest pain, palpitations or peripheral edema    OBJECTIVE:     /86 (BP Location: Right arm, Patient Position: Sitting, Cuff Size: Adult Large)   Pulse 90   Temp 98.2  F (36.8  C) (Oral)   Resp 16   Ht 1.626 m (5' 4\")   Wt 109.3 kg (241 lb)   LMP 04/05/2016   SpO2 95%   BMI 41.37 kg/m    Body mass index is 41.37 kg/m .  GENERAL: alert, no distress and morbidly obese  NECK: no adenopathy, no asymmetry, masses, or scars and thyroid normal to palpation  RESP: lungs clear to " auscultation - no rales, rhonchi or wheezes  CV: regular rates and rhythm, grade 3/6  murmur, peripheral pulses strong and no peripheral edema        ASSESSMENT/PLAN:               ICD-10-CM    1. Encounter for screening mammogram for breast cancer Z12.31 *MA Screening Digital Bilateral   2. Benign essential hypertension I10 losartan (COZAAR) 50 MG tablet     metoprolol tartrate (LOPRESSOR) 100 MG tablet     CBC with platelets     Basic metabolic panel     Albumin Random Urine Quantitative with Creat Ratio     Lipid panel reflex to direct LDL Non-fasting       6 month f/u  The current medical regimen is effective;  continue present plan and medications.      Virginia Yi NP  [facility]

## 2019-03-14 LAB
ANION GAP SERPL CALCULATED.3IONS-SCNC: 9 MMOL/L (ref 3–14)
BUN SERPL-MCNC: 10 MG/DL (ref 7–30)
CALCIUM SERPL-MCNC: 9 MG/DL (ref 8.5–10.1)
CHLORIDE SERPL-SCNC: 110 MMOL/L (ref 94–109)
CHOLEST SERPL-MCNC: 214 MG/DL
CO2 SERPL-SCNC: 23 MMOL/L (ref 20–32)
CREAT SERPL-MCNC: 0.85 MG/DL (ref 0.52–1.04)
CREAT UR-MCNC: 306 MG/DL
GFR SERPL CREATININE-BSD FRML MDRD: 78 ML/MIN/{1.73_M2}
GLUCOSE SERPL-MCNC: 103 MG/DL (ref 70–99)
HDLC SERPL-MCNC: 55 MG/DL
LDLC SERPL CALC-MCNC: 132 MG/DL
MICROALBUMIN UR-MCNC: 80 MG/L
MICROALBUMIN/CREAT UR: 25.98 MG/G CR (ref 0–25)
NONHDLC SERPL-MCNC: 159 MG/DL
POTASSIUM SERPL-SCNC: 3.6 MMOL/L (ref 3.4–5.3)
SODIUM SERPL-SCNC: 142 MMOL/L (ref 133–144)
TRIGL SERPL-MCNC: 136 MG/DL

## 2019-06-17 ENCOUNTER — TELEPHONE (OUTPATIENT)
Dept: INTERNAL MEDICINE | Facility: CLINIC | Age: 54
End: 2019-06-17

## 2019-06-17 NOTE — TELEPHONE ENCOUNTER
Panel Management Review      Patient has the following on her problem list:     Asthma review   No flowsheet data found.   1. Is Asthma diagnosis on the Problem List? Yes    2. Is Asthma listed on Health Maintenance? Yes    3. Patient is due for:  ACT    Hypertension   Last three blood pressure readings:  BP Readings from Last 3 Encounters:   03/13/19 170/86   07/05/18 150/87   07/03/18 144/84     Blood pressure: Passed    HTN Guidelines:  Less than 140/90      Composite cancer screening  Chart review shows that this patient is due/due soon for the following Pap Smear and Mammogram  Summary:    Patient is due/failing the following:   ACT, MAMMOGRAM and PAP    Action needed:   Patient needs office visit for Pap., Patient needs to do ACT. and Patient needs referral/order: Mammogram    Type of outreach:    Sent letter.    Questions for provider review:    None                                                                                                                                    DAYNA ROJAS CMA       Chart routed to no one .

## 2019-06-17 NOTE — LETTER
[facility]  [address], [address]  [place], [place]  [address]                                            TEL:[phone]  FAX:[phone]      Liss Berg  [address]  [facility] [id number] June 17, 2019    Dear Liss,    At [facility] we care about your health and well-being.  A review of your chart has indicated that you are due for a Pap,Mammogram and ACT (asthma control test-enclosed) Please complete the enclosed questionnaire and return to the clinic..  Please contact us at [phone] to schedule an appointment.    If you have already had one or all of the above screening tests at another facility, please call us to update your chart.     Sincerely,      Your [facility] care team.

## 2020-01-31 ENCOUNTER — DOCUMENTATION ONLY (OUTPATIENT)
Dept: CARDIOLOGY | Facility: CLINIC | Age: 55
End: 2020-01-31

## 2020-01-31 NOTE — PROGRESS NOTES
Patient overdue for cardiology f/up and labs, reminder letter sent. Former Diamandopoulos patient.

## 2020-01-31 NOTE — LETTER
January 31, 2020       TO: Liss Berg  [address]  [facility] [address]       Dear Liss Berg,    We are reviewing our overdue orders and see that you are due for an office visit with one of our cardiologists, as well as for lab work. Dr Leyva, who you have seen previously, is no longer practicing in our clinic. Our scheduling department will be happy to assist you in selecting an new cardiologist.     Please call our clinic at [phone] to schedule your appointment as soon as possible.    Thank you,    [facility]

## 2020-05-07 ENCOUNTER — OFFICE VISIT (OUTPATIENT)
Dept: URGENT CARE | Facility: URGENT CARE | Age: 55
End: 2020-05-07
Payer: COMMERCIAL

## 2020-05-07 ENCOUNTER — VIRTUAL VISIT (OUTPATIENT)
Dept: FAMILY MEDICINE | Facility: OTHER | Age: 55
End: 2020-05-07

## 2020-05-07 ENCOUNTER — NURSE TRIAGE (OUTPATIENT)
Dept: NURSING | Facility: CLINIC | Age: 55
End: 2020-05-07

## 2020-05-07 ENCOUNTER — ANCILLARY PROCEDURE (OUTPATIENT)
Dept: GENERAL RADIOLOGY | Facility: CLINIC | Age: 55
End: 2020-05-07
Attending: FAMILY MEDICINE
Payer: COMMERCIAL

## 2020-05-07 VITALS
TEMPERATURE: 98.7 F | RESPIRATION RATE: 15 BRPM | BODY MASS INDEX: 37.76 KG/M2 | SYSTOLIC BLOOD PRESSURE: 153 MMHG | DIASTOLIC BLOOD PRESSURE: 97 MMHG | OXYGEN SATURATION: 99 % | WEIGHT: 220 LBS | HEART RATE: 77 BPM

## 2020-05-07 DIAGNOSIS — R07.89 CHEST HEAVINESS: ICD-10-CM

## 2020-05-07 DIAGNOSIS — I10 BENIGN ESSENTIAL HYPERTENSION: ICD-10-CM

## 2020-05-07 DIAGNOSIS — R91.8 LUNG NODULES: ICD-10-CM

## 2020-05-07 DIAGNOSIS — I49.1 PREMATURE ATRIAL CONTRACTIONS: ICD-10-CM

## 2020-05-07 DIAGNOSIS — Z20.822 SUSPECTED COVID-19 VIRUS INFECTION: Primary | ICD-10-CM

## 2020-05-07 DIAGNOSIS — J18.9 PNEUMONIA OF LEFT LOWER LOBE DUE TO INFECTIOUS ORGANISM: ICD-10-CM

## 2020-05-07 LAB
ALBUMIN SERPL-MCNC: 4.2 G/DL (ref 3.4–5)
ALP SERPL-CCNC: 85 U/L (ref 40–150)
ALT SERPL W P-5'-P-CCNC: 33 U/L (ref 0–50)
ANION GAP SERPL CALCULATED.3IONS-SCNC: 6 MMOL/L (ref 3–14)
AST SERPL W P-5'-P-CCNC: 19 U/L (ref 0–45)
BASOPHILS # BLD AUTO: 0 10E9/L (ref 0–0.2)
BASOPHILS NFR BLD AUTO: 0.1 %
BILIRUB SERPL-MCNC: 1.2 MG/DL (ref 0.2–1.3)
BUN SERPL-MCNC: 9 MG/DL (ref 7–30)
CALCIUM SERPL-MCNC: 8.8 MG/DL (ref 8.5–10.1)
CHLORIDE SERPL-SCNC: 107 MMOL/L (ref 94–109)
CO2 SERPL-SCNC: 26 MMOL/L (ref 20–32)
CREAT SERPL-MCNC: 0.62 MG/DL (ref 0.52–1.04)
CRP SERPL-MCNC: <2.9 MG/L (ref 0–8)
DIFFERENTIAL METHOD BLD: NORMAL
EOSINOPHIL # BLD AUTO: 0.1 10E9/L (ref 0–0.7)
EOSINOPHIL NFR BLD AUTO: 2.1 %
ERYTHROCYTE [DISTWIDTH] IN BLOOD BY AUTOMATED COUNT: 13.2 % (ref 10–15)
ERYTHROCYTE [SEDIMENTATION RATE] IN BLOOD BY WESTERGREN METHOD: 13 MM/H (ref 0–30)
GFR SERPL CREATININE-BSD FRML MDRD: >90 ML/MIN/{1.73_M2}
GLUCOSE SERPL-MCNC: 87 MG/DL (ref 70–99)
HCT VFR BLD AUTO: 41.8 % (ref 35–47)
HGB BLD-MCNC: 14.1 G/DL (ref 11.7–15.7)
LDH SERPL L TO P-CCNC: 230 U/L (ref 81–234)
LIPASE SERPL-CCNC: 89 U/L (ref 73–393)
LYMPHOCYTES # BLD AUTO: 1.7 10E9/L (ref 0.8–5.3)
LYMPHOCYTES NFR BLD AUTO: 25.7 %
MCH RBC QN AUTO: 29 PG (ref 26.5–33)
MCHC RBC AUTO-ENTMCNC: 33.7 G/DL (ref 31.5–36.5)
MCV RBC AUTO: 86 FL (ref 78–100)
MONOCYTES # BLD AUTO: 0.7 10E9/L (ref 0–1.3)
MONOCYTES NFR BLD AUTO: 10.8 %
NEUTROPHILS # BLD AUTO: 4.1 10E9/L (ref 1.6–8.3)
NEUTROPHILS NFR BLD AUTO: 61.3 %
PLATELET # BLD AUTO: 229 10E9/L (ref 150–450)
POTASSIUM SERPL-SCNC: 3.4 MMOL/L (ref 3.4–5.3)
PROT SERPL-MCNC: 8.4 G/DL (ref 6.8–8.8)
RBC # BLD AUTO: 4.87 10E12/L (ref 3.8–5.2)
SODIUM SERPL-SCNC: 139 MMOL/L (ref 133–144)
TROPONIN I SERPL-MCNC: <0.015 UG/L (ref 0–0.04)
WBC # BLD AUTO: 6.7 10E9/L (ref 4–11)

## 2020-05-07 PROCEDURE — 93000 ELECTROCARDIOGRAM COMPLETE: CPT | Performed by: FAMILY MEDICINE

## 2020-05-07 PROCEDURE — 36415 COLL VENOUS BLD VENIPUNCTURE: CPT | Performed by: FAMILY MEDICINE

## 2020-05-07 PROCEDURE — 84484 ASSAY OF TROPONIN QUANT: CPT | Performed by: FAMILY MEDICINE

## 2020-05-07 PROCEDURE — 83615 LACTATE (LD) (LDH) ENZYME: CPT | Performed by: FAMILY MEDICINE

## 2020-05-07 PROCEDURE — 80053 COMPREHEN METABOLIC PANEL: CPT | Performed by: FAMILY MEDICINE

## 2020-05-07 PROCEDURE — 83690 ASSAY OF LIPASE: CPT | Performed by: FAMILY MEDICINE

## 2020-05-07 PROCEDURE — 85025 COMPLETE CBC W/AUTO DIFF WBC: CPT | Performed by: FAMILY MEDICINE

## 2020-05-07 PROCEDURE — 85652 RBC SED RATE AUTOMATED: CPT | Performed by: FAMILY MEDICINE

## 2020-05-07 PROCEDURE — 71046 X-RAY EXAM CHEST 2 VIEWS: CPT

## 2020-05-07 PROCEDURE — 86140 C-REACTIVE PROTEIN: CPT | Performed by: FAMILY MEDICINE

## 2020-05-07 PROCEDURE — 99215 OFFICE O/P EST HI 40 MIN: CPT | Performed by: FAMILY MEDICINE

## 2020-05-07 RX ORDER — MONTELUKAST SODIUM 10 MG/1
10 TABLET ORAL AT BEDTIME
Qty: 30 TABLET | Refills: 0 | Status: SHIPPED | OUTPATIENT
Start: 2020-05-07 | End: 2021-11-29

## 2020-05-07 RX ORDER — METOPROLOL TARTRATE 100 MG
100 TABLET ORAL 2 TIMES DAILY
Qty: 60 TABLET | Refills: 0 | Status: SHIPPED | OUTPATIENT
Start: 2020-05-07 | End: 2021-11-29

## 2020-05-07 RX ORDER — ALBUTEROL SULFATE 0.83 MG/ML
2.5 SOLUTION RESPIRATORY (INHALATION) EVERY 6 HOURS PRN
Qty: 1 BOX | Refills: 0 | Status: SHIPPED | OUTPATIENT
Start: 2020-05-07

## 2020-05-07 RX ORDER — LOSARTAN POTASSIUM 50 MG/1
50 TABLET ORAL DAILY
Qty: 30 TABLET | Refills: 0 | Status: SHIPPED | OUTPATIENT
Start: 2020-05-07 | End: 2021-07-18

## 2020-05-07 RX ORDER — DOXYCYCLINE 100 MG/1
100 CAPSULE ORAL 2 TIMES DAILY
Qty: 20 CAPSULE | Refills: 0 | Status: SHIPPED | OUTPATIENT
Start: 2020-05-07 | End: 2020-05-17

## 2020-05-07 RX ORDER — IPRATROPIUM BROMIDE AND ALBUTEROL SULFATE 2.5; .5 MG/3ML; MG/3ML
1 SOLUTION RESPIRATORY (INHALATION) EVERY 6 HOURS PRN
Qty: 1 BOX | Refills: 0 | Status: SHIPPED | OUTPATIENT
Start: 2020-05-07 | End: 2021-11-29

## 2020-05-07 NOTE — PROGRESS NOTES
SUBJECTIVE:  Chief Complaint   Patient presents with     Urgent Care     JUSTICE     Liss Berg is a 55 year old female who presents to the clinic today with a chief complaint of shortness of breath., wheezing. and chest heaviness  for 5 day(s).  Patient denies pleuritic chest pain  She works at a  of a nursing home and has had multiple exposures to Covid 19  Her cough is described as occasional, nonproductive and wheezing,  Has been using her albuterol inhaler    The patient's symptoms-  No fever/ chills/ no gerd,, Symptoms  are moderate and slowly worsening.  Associated symptoms- she denies nasal congestion, rhinorrhea and sore throat.   The patient's symptoms are exacerbated by exercise  Patient has been using inhaler  to improve symptoms.    She needs temporary refill of BP meds.- close to running out    Has needed to use her inhaler 4-6 x day for tightness/ wheezing    Past Medical History:   Diagnosis Date     Asthma      Heart murmur      Hypertension      Patient Active Problem List   Diagnosis     Cellulitis of leg     Cellulitis     Abdominal pain     Colorectal carcinoma (H)     Benign essential hypertension     Mild intermittent asthma without complication     Chronic left shoulder pain     Left shoulder pain     Morbid obesity (H)     Hypertrophic cardiomyopathy (H)       ALLERGIES:  Morphine and Penicillins    MEDs  albuterol (PROAIR HFA, PROVENTIL HFA, VENTOLIN HFA) 108 (90 BASE) MCG/ACT inhaler, Inhale 2 puffs into the lungs every 6 hours as needed for shortness of breath / dyspnea or wheezing  ferrous sulfate (IRON) 325 (65 FE) MG tablet, Take 1 tablet (325 mg) by mouth daily (with breakfast)  losartan (COZAAR) 50 MG tablet, Take 1 tablet (50 mg) by mouth daily  metoprolol tartrate (LOPRESSOR) 100 MG tablet, Take 1 tablet (100 mg) by mouth 2 times daily  dexamethasone (DECADRON) 4 MG/ML injection, Inject 1 mL (4 mg) as directed once for 1 dose  oxyCODONE IR (ROXICODONE) 5 MG  tablet, Take 1 tablet (5 mg) by mouth every 3 hours as needed for pain or other (Moderate to Severe) (Patient not taking: Reported on 5/7/2020)  traZODone (DESYREL) 50 MG tablet, Take 0.5-2 tablets ( mg) by mouth nightly as needed for sleep (Patient not taking: Reported on 5/7/2020)    No current facility-administered medications on file prior to visit.       Social History     Tobacco Use     Smoking status: Never Smoker     Smokeless tobacco: Never Used   Substance Use Topics     Alcohol use: Yes     Alcohol/week: 0.0 standard drinks     Comment: less that once per month - very rare       Family History   Problem Relation Age of Onset     Bronchitis Father        Review Of Systems    Constitutional:  Negative for fevers, chills,  Has had  fatigue  Skin: negative for rash or lesions  Eyes: negative for eye pain, visual changes  Ears/Nose/Throat: negative for earache  ,  persistent sore throat,  Has some nasal stuffiness  Respiratory: No shortness of breath, dyspnea on exertion  or hemoptysis  Cardiovascular: negative for, palpitations, tachycardia or chest pain- hx hypertrophic cardiomyopathy  Gastrointestinal: negative for vomiting, abdominal pain or diarrhea  Genitourinary: negative for dysuria or hematuria  Back: negative for pain with back movement,  CVA pain  Endocrine: negative for thyroid disorder or diabetes    OBJECTIVE:  BP (!) 153/97   Pulse 77   Temp 98.7  F (37.1  C)   Resp 15   Wt 99.8 kg (220 lb)   LMP 04/05/2016   SpO2 99%   BMI 37.76 kg/m    GENERAL APPEARANCE: alert, moderate distress and cooperative  EYES: EOMI,  PERRL, conjunctiva clear  HENT: ear canals and TM's normal.  Nose and mouth without ulcers, erythema or lesions  NECK: supple, nontender, no lymphadenopathy  RESP: lungs clear to auscultation - no rales, rhonchi or wheezes  CV: regular rates and rhythm, but with pauses every 4-6 beats. normal S1 S2, no murmur noted  ABDOMEN:  soft, nontender, no HSM or masses and bowel  sounds normal  NEURO: Normal strength and tone, sensory exam grossly normal,  normal speech and mentation-  Walks on toes, heels, tandem walk without difficulty- Romberg negative  SKIN: no suspicious lesions or rashes    Chest X-ray: LLL-  Two localized areas of infiltrate,  Cardiomegaly (marginal)  with boot shape to the heart, no pneumothorax   x-ray read by me Talia Adams MD    Results for orders placed or performed in visit on 05/07/20   XR Chest 2 Views     Status: None    Narrative    CHEST TWO VIEWS 5/7/2020 5:34 PM     HISTORY: Suspected Covid-19 Virus Infection; Chest heaviness    COMPARISON: CT dated 6/18/2019       Impression    IMPRESSION: No pleural fluid or pneumothorax. There is a new 11 mm  opacity in the left midlung which could represent airspace disease or  a nodule. Follow-up chest radiograph is recommended in 4-6 weeks. An  adjacent 7 mm nodular opacity corresponds with the known calcified  left upper lobe pulmonary nodule. There is mild interstitial edema.  The cardiomediastinal silhouette is at the upper limits of normal in  size.    LESTER J FAHRNER, MD   Troponin I     Status: None   Result Value Ref Range    Troponin I ES <0.015 0.000 - 0.045 ug/L   CBC with platelets differential     Status: None   Result Value Ref Range    WBC 6.7 4.0 - 11.0 10e9/L    RBC Count 4.87 3.8 - 5.2 10e12/L    Hemoglobin 14.1 11.7 - 15.7 g/dL    Hematocrit 41.8 35.0 - 47.0 %    MCV 86 78 - 100 fl    MCH 29.0 26.5 - 33.0 pg    MCHC 33.7 31.5 - 36.5 g/dL    RDW 13.2 10.0 - 15.0 %    Platelet Count 229 150 - 450 10e9/L    Diff Method Automated Method     % Neutrophils 61.3 %    % Lymphocytes 25.7 %    % Monocytes 10.8 %    % Eosinophils 2.1 %    % Basophils 0.1 %    Absolute Neutrophil 4.1 1.6 - 8.3 10e9/L    Absolute Lymphocytes 1.7 0.8 - 5.3 10e9/L    Absolute Monocytes 0.7 0.0 - 1.3 10e9/L    Absolute Eosinophils 0.1 0.0 - 0.7 10e9/L    Absolute Basophils 0.0 0.0 - 0.2 10e9/L   Comprehensive metabolic panel      Status: None   Result Value Ref Range    Sodium 139 133 - 144 mmol/L    Potassium 3.4 3.4 - 5.3 mmol/L    Chloride 107 94 - 109 mmol/L    Carbon Dioxide 26 20 - 32 mmol/L    Anion Gap 6 3 - 14 mmol/L    Glucose 87 70 - 99 mg/dL    Urea Nitrogen 9 7 - 30 mg/dL    Creatinine 0.62 0.52 - 1.04 mg/dL    GFR Estimate >90 >60 mL/min/[1.73_m2]    GFR Estimate If Black >90 >60 mL/min/[1.73_m2]    Calcium 8.8 8.5 - 10.1 mg/dL    Bilirubin Total 1.2 0.2 - 1.3 mg/dL    Albumin 4.2 3.4 - 5.0 g/dL    Protein Total 8.4 6.8 - 8.8 g/dL    Alkaline Phosphatase 85 40 - 150 U/L    ALT 33 0 - 50 U/L    AST 19 0 - 45 U/L   Lipase     Status: None   Result Value Ref Range    Lipase 89 73 - 393 U/L   CRP inflammation     Status: None   Result Value Ref Range    CRP Inflammation <2.9 0.0 - 8.0 mg/L   Erythrocyte sedimentation rate auto     Status: None   Result Value Ref Range    Sed Rate 13 0 - 30 mm/h   Lactate Dehydrogenase     Status: None   Result Value Ref Range    Lactate Dehydrogenase 230 81 - 234 U/L     D-dimer was ordered- but unable to run when sent to the hospital due to the sample clotted    ASSESSMENT:    Suspected Covid-19 Virus Infection     - XR Chest 2 Views  - Troponin I  - D dimer quantitative  - EKG 12-lead complete w/read - Clinics  - CRP inflammation  - Lactate Dehydrogenase    Patient has had 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-  Arranged through [employer] to have her drive-up Covid testing    Discussed with her that the labs most concerning in Covid-  Her LDH is at upper limit of normal- has normal CRP-  Little likelihood of cytokine storm now  Her d-dimer -unable to get result because blood clotted-  Discussed that covid is marked by increased clotting concerns    Chest heaviness     - XR Chest 2 Views  - Troponin I  - CBC with platelets differential  - Comprehensive metabolic panel  - Lipase  - D dimer quantitative  - EKG 12-lead complete w/read - Clinics  - CRP inflammation  - Erythrocyte  sedimentation rate auto     Reassured negative troponin-  Likely associated with covid       Benign essential hypertension     - metoprolol tartrate (LOPRESSOR) 100 MG tablet; Take 1 tablet (100 mg) by mouth 2 times daily  - losartan (COZAAR) 50 MG tablet; Take 1 tablet (50 mg) by mouth daily    Discussed importance of managing hypertension, especially some protective features of Losartan in covid infection    Moderate intermittent asthma with acute exacerbation     - montelukast (SINGULAIR) 10 MG tablet; Take 1 tablet (10 mg) by mouth At Bedtime-  Will do trial to see if this is a helpful medication for her asthma  - order for DME; nebulizer  - ipratropium - albuterol 0.5 mg/2.5 mg/3 mL (DUONEB) 0.5-2.5 (3) MG/3ML neb solution; Take 1 vial (3 mLs) by nebulization every 6 hours as needed for shortness of breath / dyspnea or wheezing  - albuterol (PROVENTIL) (2.5 MG/3ML) 0.083% neb solution; Take 1 vial (2.5 mg) by nebulization every 6 hours as needed for shortness of breath / dyspnea or wheezing      Pneumonia of left lower lobe due to infectious organism (H)     - doxycycline hyclate (VIBRAMYCIN) 100 MG capsule; Take 1 capsule (100 mg) by mouth 2 times daily for 10 days    LLL infiltrate-  Will treat as pneumonia- likely associated with Covid 19-    Lung nodules    One old nodule identified by radiology, and second larger area - unclear if infiltrate from infection or due to a new nodule-  Discussed with the patient that she needs repeat CXR in 4-6 weeks to evaluate if there is a persistent nodule , or if infection resolved.    Premature atrial contractions    Reviewed with the patient the frequent PAC's, and how I noted pauses when listening to her heart.  Recommend follow-up with primary care.  May consider Holter/ ambulatory EKG monitoring.   She says she also has loud snoring- never had evaluation for sleep apnea.  She will discuss with primary care about possible sleep apnea evaluation        Direct patient  care for this visit lasted 60 minutes and more than half of the time involved counseling and coordination of care related to multiple cardiopulmonary concerns

## 2020-05-07 NOTE — PROGRESS NOTES
"Date: 2020 16:56:43  Clinician: Jacob Mccabe  Clinician NPI: [id number]  Patient: Liss Berg  Patient : 1965  Patient Address: [address] [place] [address]  Patient Phone: [phone]  Visit Protocol: URI  Patient Summary:  Liss is a 55 year old ( : 1965 ) female who initiated a Visit for COVID-19 (Coronavirus) evaluation and screening. When asked the question \"Please sign me up to receive news, health information and promotions from [employer].\", Liss responded \"No\".    Liss states her symptoms started 1-2 days ago.   Her symptoms consist of a sore throat, a cough, and malaise. Liss also feels feverish.   Symptom details     Cough: Liss coughs a few times an hour and her cough is not more bothersome at night. Phlegm comes into her throat when she coughs. She does not believe her cough is caused by post-nasal drip. The color of the phlegm is white.     Sore throat: Liss reports having moderate throat pain (4-6 on a 10 point pain scale), does not have exudate on her tonsils, and can swallow liquids. The lymph nodes in her neck are not enlarged. A rash has not appeared on the skin since the sore throat started.     Temperature: Her current temperature is 98.9 degrees Fahrenheit.      Liss denies having myalgias, rhinitis, facial pain or pressure, nasal congestion, vomiting, nausea, teeth pain, ageusia, anosmia, diarrhea, ear pain, headache, wheezing, enlarged lymph nodes, and chills. She also denies taking antibiotic medication for the symptoms and having recent facial or sinus surgery in the past 60 days.   Precipitating events  Within the past week, Liss has not been exposed to someone with strep throat. She has not recently been exposed to someone with influenza. Liss has been in close contact with the following high risk individuals: adults 65 or older and people with asthma, heart disease or diabetes.   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 She provides direct patient care. 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   Liss has had a close contact with a laboratory-confirmed COVID-19 patient within 14 days of symptom onset. She also has had a close contact with a suspected COVID-19 patient within 14 days of symptom onset. She was not exposed at her work. Additional information about contact with COVID-19 (Coronavirus) patient as reported by the patient (free text): Someone that handed me a canned beverage tested positive 2 days after I came in contact.   Triage Point(s) temporarily suspended for COVID-19 (Coronavirus) screening  Liss reported the following symptoms which were previously protocol referral points. These protocol referral points have temporarily been removed for purposes of COVID-19 (Coronavirus) screening.   Difficulty breathing even when resting and can only speak in phrase(s)   Pertinent medical history  Liss does not get yeast infections when she takes antibiotics.   Liss needs a return to work/school note.   Weight: 220 lbs   Liss does not smoke or use smokeless tobacco.   Additional information as reported by the patient (free text): Dr. Staley wants me tested in the [facility] for covid 19   Weight: 220 lbs    MEDICATIONS: metoprolol tartrate oral, losartan oral, ALLERGIES: Penicillins  Clinician Response:  Dear Liss,   Your symptoms show that you may have coronavirus (COVID-19). This illness can cause fever, cough and trouble breathing. Many people get a mild case and get better on their own. Some people can get very sick.   Will I be tested for COVID-19?  We would recommend you be tested for coronavirus. Here is how to get  that scheduled:   Call [phone]. Tell them you were referred by [facility] to have a COVID-19 test. You will be scheduled at one of our [facility] testing locations (drive-up). Please have your [facility] visit information ready when you call including your visit ID number to verify you were referred.    How can I protect others in the meantime?  First, stay home and away from others (self-isolate) until:   You've had no fever---and no medicine that reduces fever---for 3 full days (72 hours). And...    Your other symptoms have gotten better. For example, your cough or breathing has improved. And...    At least 10 days have passed since your symptoms started.   During this time:   Don't go to work, school or anywhere else.     Stay away from others in your home. No hugging, kissing or shaking hands.    Don't let anyone visit.    Cover your mouth and nose with a mask, tissue or wash cloth to avoid spreading germs.    Wash your hands and face often. Use soap and water.   How can I take care of myself?  1.Take Tylenol (acetaminophen) for fever or pain. If you have liver or kidney problems, ask your family doctor if it's okay to take Tylenol.   Adults can take either:    650 mg (two 325 mg pills) every 4 to 6 hours, or...    1,000 mg (two 500 mg pills) every 8 hours as needed.     Note: Don't take more than 3,000 mg in one day.   For children, check the Tylenol bottle for the right dose. The dose is based on the child's age or weight.  2.If you have other health problems (like cancer, heart failure, an organ transplant or severe kidney disease): Call your specialty clinic if you don't feel better in the next 2 days.  3.Know when to call 911: If your breathing is so bad that it keeps you from doing normal activities, call 911 or go to the emergency room. Tell them that you've been staying home and may have COVID-19.  4.Sign up for [facility]. We know it's scary to hear that you might have COVID-19. We want to track your  symptoms to make sure you're okay over the next 2 weeks. Please look for an email from [employer]---this is a free, online program that we'll use to keep in touch. To sign up, follow the link in the email. Learn more at [email or web address].  Where can I get more information?  To learn more about COVID-19 and how to care for yourself at home, please visit the CDC website at https://www.cdc.gov/coronavirus/2019-ncov/about/steps-when-sick.html.  For more about your care at [facility], please visit [email or web address]     Diagnosis: Acute upper respiratory infection, unspecified  Diagnosis ICD: J06.9

## 2020-05-07 NOTE — PROGRESS NOTES
"Date: 2020 10:58:45  Clinician: Sue Moura  Clinician NPI: [id number]  Patient: Liss Berg  Patient : 1965  Patient Address: [address] [place] [address]  Patient Phone: [phone]  Visit Protocol: URI  Patient Summary:  Liss is a 55 year old ( : 1965 ) female who initiated a Visit for COVID-19 (Coronavirus) evaluation and screening. When asked the question \"Please sign me up to receive news, health information and promotions from [employer].\", Liss responded \"No\".    Liss states her symptoms started 1-2 days ago.   Her symptoms consist of a sore throat, a cough, and malaise. Liss also feels feverish.   Symptom details     Cough: Liss coughs a few times an hour and her cough is not more bothersome at night. Phlegm comes into her throat when she coughs. She does not believe her cough is caused by post-nasal drip. The color of the phlegm is white.     Sore throat: Liss reports having moderate throat pain (4-6 on a 10 point pain scale), does not have exudate on her tonsils, and can swallow liquids. The lymph nodes in her neck are not enlarged. A rash has not appeared on the skin since the sore throat started.     Temperature: Her current temperature is 99.2 degrees Fahrenheit.      Liss denies having myalgias, rhinitis, facial pain or pressure, nasal congestion, vomiting, nausea, teeth pain, ageusia, anosmia, diarrhea, ear pain, headache, wheezing, enlarged lymph nodes, and chills. She also denies taking antibiotic medication for the symptoms and having recent facial or sinus surgery in the past 60 days.   Precipitating events  Within the past week, Liss has not been exposed to someone with strep throat. She has not recently been exposed to someone with influenza. Liss has been in close contact with the following high risk individuals: adults 65 or older and people with asthma, heart disease or diabetes.   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 She provides direct patient care. 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   She lives with a healthcare worker.   Liss has had a close contact with a laboratory-confirmed COVID-19 patient within 14 days of symptom onset. She also has had a close contact with a suspected COVID-19 patient within 14 days of symptom onset. She was not exposed at her work. Additional information about contact with COVID-19 (Coronavirus) patient as reported by the patient (free text): Someone I came in contact with 2 weeks ago. He handed me a canned beverage. 2 days later he tested positive.   Triage Point(s) temporarily suspended for COVID-19 (Coronavirus) screening  Liss reported the following symptoms which were previously protocol referral points. These protocol referral points have temporarily been removed for purposes of COVID-19 (Coronavirus) screening.   Difficulty breathing even when resting and can only speak in phrase(s)   Pertinent medical history  Liss does not get yeast infections when she takes antibiotics.   Liss needs a return to work/school note.   Weight: 220 lbs   Liss does not smoke or use smokeless tobacco.   Weight: 220 lbs  A synchronous phone visit was initiated by the provider for the following reason: dyspnea needs clarification    MEDICATIONS: metoprolol tartrate oral, losartan oral, ALLERGIES: Penicillins  Clinician Response:  Dear Liss,  I am sorry you are not feeling well. Your health is our priority. Based on the information you have provided, it is possible that you may have some type of viral infection.  Please read the full treatment plan and see my recommendations below.  Medication information  Because you have a viral  infection, antibiotics will not help you get better. Treating a viral infection with antibiotics could actually make you feel worse.  Self care  Steps you can take to be as comfortable as possible:     Rest.    Drink plenty of fluids.    Take a warm shower to loosen congestion    Use a cool-mist humidifier.    Use throat lozenges.    Suck on frozen items such as popsicles.    Drink hot tea with lemon and honey.    Gargle with warm salt water (1/4 teaspoon of salt per 8 ounce glass of water).    Take a spoonful of honey to reduce your cough.     Additional treatment plan      Dear Liss  Your symptoms show that you may have coronavirus (COVID-19). This illness can cause fever, cough and trouble breathing. Many people get a mild case and get better on their own. Some people can get very sick.  Based on the details you've shared, we'd like you to go to an urgent care clinic or a primary care clinic (family clinic). We offer several clinics for people who have symptoms of COVID-19.   Note: At this time, our clinics and Urgent cares are not testing patients for COVID-19. This testing is done at the [facility] for people with high risk conditions and certain populations.   What should I do?  1.Call [facility] ([phone]).   2.Tell them you may have COVID-19, and an [facility] provider has referred you for an in-person visit.  3.Ask to schedule a visit at the nearest clinic for COVID patients.   If you live in or near [facility]: Go to the [facility] ([facility]). You don't need to call ahead or make an appointment.   Open daily from 10 am to 8 pm, except on holidays  Check in at the main entrance  For more information, call [phone]    COVID-19 (Coronavirus) General Information  Because there is currently no vaccine to prevent infection, the best way to protect yourself is to avoid being exposed to this virus. Common symptoms of COVID-19 include but are not limited to fever, cough, and  shortness of breath. These symptoms appear 2-14 days after you are exposed to the virus that causes COVID-19. Click here for more information from the CDC on how to protect yourself.  If you are sick with COVID-19 or suspect you are infected with the virus that causes COVID-19, follow the steps here from the CDC to help prevent the disease from spreading to people in your home and community.  Click here for general information from the CDC on testing.  If you develop any of these emergency warning signs for COVID-19, get medical attention immediately:     Trouble breathing    Persistent pain or pressure in the chest    New confusion or inability to arouse    Bluish lips or face      Call your doctor or clinic before going in. Call 911 if you have a medical emergency and notify the  you have or think you may have COVID-19.  For more detailed and up to date information on COVID-19 (Coronavirus), please visit the CDC website.   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 I could refer you for testing but per our phone conversation and your chest heaviness and shortness of breath I am instead referring you to urgent care or your primary care provider for further evaluation. As shown above call [address] [place] [address] to schedule an appointment. Make sure you give them your [facility] ID# as discussed which is [id number]. You will also need to isolate yourself for at least 10 days from onset and also will need to be fever free for 72 hours and symptom free before leaving isolation. If your symptoms become emergent before you are seen call 911 or go directly to the emergency room.Take care and I hope you feel better soon.  Synchronous Triage: phone, status: completed, duration: 70 seconds

## 2020-05-07 NOTE — TELEPHONE ENCOUNTER
benjamin carlton from [facility] calling.  Unable to run the ordered D-dimer as it clotted.  [facility]     Call handed off to Polina Loyola RN to page provider.    lisa salinas RN on 5/7/2020 at 6:45 PM

## 2020-05-07 NOTE — TELEPHONE ENCOUNTER
patient calling to schedule actual appointment with provider per [facility] virtual visit.   Advised UC visit today at 5;;00pm.  Jessa Giang RN  COVID 19 Nurse Triage Plan/Patient Instructions    Please be aware that novel coronavirus (COVID-19) may be circulating in the community. If you develop symptoms such as fever, cough, or SOB or if you have concerns about the presence of another infection including coronavirus (COVID-19), please contact your health care provider or visit [email or web address].     Disposition/Instructions  Per [facility] virtual vist, recommended in person visit at urgent care today for chest pressure and shortness of breath.    Additional COVID19 information to add for patients.     Additional General Information About COVID-19    COVID-19 - General Information:  Regardless of if you have been tested or not:  Patient who have symptoms (cough, fever, or shortness of breath), need to isolate for 7 days from when symptoms started AND 72 hours after fever resolves (without fever reducing medications) AND improvement of respiratory symptoms (whichever is longer).      Isolate yourself at home (in own room/own bathroom if possible)    Do Not allow any visitors    Do Not go to work or school    Do Not go to [employer],  centers, shopping, or other public places.    Do Not shake hands.    Avoid close and intimate contact with others (hugging, kissing).    Follow CDC recommendations for household cleaning of frequently touched services.     After the initial 7 days, continue to isolate yourself from household members as much as possible. To continue decrease the risk of community spread and exposure, you and any members of your household should limit activities in public for 14 days after starting home isolation.     You can reference the following CDC link for helpful home isolation/care tips:  https://www.cdc.gov/coronavirus/2019-ncov/downloads/10Things.pdf    COVID-19 - Symptoms:     The COVID-19 can  cause a respiratory illness, such as bronchitis or pneumonia.    The most common symptoms are: cough, fever, and shortness of breath.     Other symptoms are: body aches, chills, diarrhea, fatigue, headache, runny nose, and sore throat     COVID-19 - Exposure Risk Factors:    Exposure to a person who has been diagnosed with COVID-19 .    Travel from an area with recent local transmission of COVID-19 .    The CDC (www.cdc.gov) has the most up-to-date list of where the COVID-19 outbreak is occurring.    COVID-19 - Spreading:     The virus likely spreads through respiratory droplets produced when a person coughs or sneezes. These respiratory droplets can travel approximately 6 feet and can remain on surfaces.  Common disinfectants will kill the virus.    The CDC currently does not recommend healthy people wearing masks.    COVID-19 - Protect Yourself:     Avoid close contact with people known to have this new COVID-19 infection.    Wash hands often with soap and water or alcohol-based hand .    Avoid touching the eyes, nose or mouth.       Thank you for limiting contact with others, wearing a simple mask to cover your cough, practice good hand hygiene habits and accessing our virtual services where possible to limit the spread of this virus.    For more information about COVID19 and options for caring for yourself at home, please visit the CDC website at https://www.cdc.gov/coronavirus/2019-ncov/about/steps-when-sick.html  For more options for care at [facility], please visit our website at [email or web address]    For more information, please use the [place] Department of Health COVID-19 Website: https://www.health.state.[place]/diseases/coronavirus/index.html  [place] Department of Health ([facility]) COVID-19 Hotlines (Interpreters available):      Health questions: Phone Number: [phone] or 1-439.598.2125 and Hours: 7 a.m. to 7 p.m.    Schools and  questions: Phone  Number: [phone] or [phone] and Hours 7 a.m. to 7 p.m.                [facility] Specific Disposition  - REQUIRED: [facility] Specific Patient Instructions  COVID 19 Nurse Triage Plan/Patient Instructions    Please be aware that novel coronavirus (COVID-19) may be circulating in the community. If you develop symptoms such as fever, cough, or SOB or if you have concerns about the presence of another infection including coronavirus (COVID-19), please contact your health care provider or visit [email or web address].       Additional COVID19 information to add for patients.     Additional General Information About COVID-19    COVID-19 - General Information:  Regardless of if you have been tested or not:  Patient who have symptoms (cough, fever, or shortness of breath), need to isolate for 7 days from when symptoms started AND 72 hours after fever resolves (without fever reducing medications) AND improvement of respiratory symptoms (whichever is longer).    Isolate yourself at home (in own room/own bathroom if possible)  Do Not allow any visitors  Do Not go to work or school  Do Not go to [employer],  centers, shopping, or other public places.  Do Not shake hands.  Avoid close and intimate contact with others (hugging, kissing).  Follow CDC recommendations for household cleaning of frequently touched services.     After the initial 7 days, continue to isolate yourself from household members as much as possible. To continue decrease the risk of community spread and exposure, you and any members of your household should limit activities in public for 14 days after starting home isolation.     You can reference the following CDC link for helpful home isolation/care tips:  https://www.cdc.gov/coronavirus/2019-ncov/downloads/10Things.pdf    COVID-19 - Symptoms:   The COVID-19 can cause a respiratory illness, such as bronchitis or pneumonia.  The most common symptoms are: cough, fever, and shortness of  breath.   Other symptoms are: body aches, chills, diarrhea, fatigue, headache, runny nose, and sore throat     COVID-19 - Exposure Risk Factors:  Exposure to a person who has been diagnosed with COVID-19 .  Travel from an area with recent local transmission of COVID-19 .  The [facility] (www.cdc.gov) has the most up-to-date list of where the COVID-19 outbreak is occurring.    COVID-19 - Spreading:   The virus likely spreads through respiratory droplets produced when a person coughs or sneezes. These respiratory droplets can travel approximately 6 feet and can remain on surfaces.  Common disinfectants will kill the virus.  The CDC currently does not recommend healthy people wearing masks.    COVID-19 - Protect Yourself:   Avoid close contact with people known to have this new COVID-19 infection.  Wash hands often with soap and water or alcohol-based hand .  Avoid touching the eyes, nose or mouth.     Thank you for limiting contact with others, wearing a simple mask to cover your cough, practice good hand hygiene habits and accessing our [employer] services where possible to limit the spread of this virus.    For more information about COVID19 and options for caring for yourself at home, please visit the CDC website at https://www.cdc.gov/coronavirus/2019-ncov/about/steps-when-sick.html  For more options for care at [facility], please visit our website at [email or web address]    For more information, please use the [place] Department of Health ([facility]) COVID-19 Hotlines (Interpreters available):   Health questions: Phone Number: [phone] or [phone] and Hours: 7 a.m. to 7 p.m.  Schools and  questions: Phone Number: [phone] or [phone] and Hours 7 a.m. to 7 p.m.    Protocols used: CORONAVIRUS (COVID-19) DIAGNOSED OR SROCHSCC[facility]-N-IX 4.22.20

## 2020-05-07 NOTE — TELEPHONE ENCOUNTER
was calling to schedule a curbside covid test. Transferred to scheduling.    Manjula Swan RN/ [facility]      Reason for Disposition    Question about upcoming scheduled test, no triage required and triager able to answer question    Protocols used: INFORMATION ONLY CALL-A-AH

## 2020-05-08 NOTE — PATIENT INSTRUCTIONS
"  Patient Education     About Arrhythmias    Electrical impulses cause the normal heart to beat 60 to 100 times a minute while at rest. These impulses come from a natural pacemaker called the sinus node. It is,  inside the right upper heart chamber. Electrical impulses travel throughout the upper heart chambers (the atria) before reaching the bottom muscle chambers ((the ventricles) through an electrical connection called the AV node. Each impulse causes the heart muscle to contract. This causes the blood to flow through the heart and out to the tissues and organs of your body.  An arrhythmia is a change from the normal speed or pattern of these electrical impulses. This can cause the heart to beat too fast (tachycardia), too slow (bradycardia), or in an unsteady pattern (irregular rhythm).  Symptoms of arrhythmias  Different people experience arrhythmias differently. And different arrhythmias can cause different symptoms. Sometimes you may not have symptoms, but just notice a change in your pulse. Symptoms can include:    Fluttering feeling in the chest    Shortness of breath    Chest pain or pressure    Neck fullness    Lightheadedness or dizziness    Fainting or almost fainting    Palpitations. This is the sense that your heart is fluttering or beating fast or hard or irregularly.    Tiredness, fatigue, or weakness    Cardiac arrest, and death, in serious arrhythmias  Causes of arrhythmias  Arrhythmias are most often caused by heart disease, such as:    Coronary artery disease (\"blocked arteries\")    Heart valve disease    Enlarged heart    High blood pressure    Heart failure  Other causes of arrhythmia include:    Certain medicines such as asthma inhalers and decongestants    Some herbal supplements    Cardiac stimulant drugs such as cocaine, amphetamine, and diet pills, and certain decongestant cold medicines, caffeine, and nicotine    Heavy use of alcohol    Anxiety and panic disorder    Thyroid " disease    Anemia    Diabetes    Sleep apnea    Obesity    Congenital heart disease    Cardiac genetic diseases    Electrolyte imbalance. Electrolytes are substances that help regulate normal heartbeat., High or low levels of certain electrolytes such as potassium or magnesium may affect the heartbeat and contribute to arrhythmia.  Arrhythmias can often be prevented. The cause and type of arrhythmia determines the best treatment. Sometimes your doctor may want to monitor your heart rate over a 24-hour period or longer. This can help find the cause of your arrhythmia and find the best treatment. This can be done with a Holter monitor. This is a portable electrocardiogram (ECG) recording device attached by wires to your chest. Or you may get an event monitor, which you can place over the skin in front of your heart to record heart rhythms. You can carry this with you as you go about your routine activities during the monitoring period. Implantable loop recorders may also be used to monitor the heart rhythm for up to 3 years. This miniature device is placed underneath the skin over the heart.  Home care  These guidelines will help you care for yourself at home:    Stay away from cardiac stimulants such as cocaine, amphetamine, diet pills, certain decongestant cold medicines, caffeine, and nicotine.    If you smoke, stop smoking. Contact your doctor or a local stop-smoking program for help.    Tell your doctor about any prescription, over-the-counter, or herbal medicines you take. These may be affecting your heart rhythm.  Follow-up care  Follow up with your healthcare provider, or as advised. If a Holter monitor has been recommended, contact the cardiologist you have been referred to as soon as you can  the device. Other outpatient tests may also be arranged for you at that time.  Call 911  This is the fastest and safest way to get to the emergency department. The paramedics can also start treatment on the way  "to the hospital, if needed.  Don't wait until your symptoms are severe to call 911. Other reasons to call 911 besides chest pain include:    Chest pain radiating to the shoulder, arm, neck, or back.    Shortness of breath    Feeling lightheaded, faint, or dizzy    Unexplained fainting    Rapid heart beat    Slower than usual heart rate compared to your normal    Very irregular heartbeat    Chest pain (angina) with weakness, dizziness, heavy sweating, nausea, or vomiting    Extreme drowsiness, or confusion    Weakness of an arm or leg or one side of the face    Trouble with speech or vision  When to seek medical advice  Remember, things are not always like they are on TV. Sometimes it is not so obvious. You may only feel weak or just \"not right.\" If it is not clear or if you have any doubt, call for advice.    Seek help for chest pain, or if something feels different from usual, even if your symptoms are mild.    Don't drive yourself. Have someone else drive. If no one can drive you, call 911.    If your doctor has given you medicines to take when you have symptoms, take them, but don't delay getting help while trying to find them.  Date Last Reviewed: 4/25/2016 2000-2019 [employer]. [address] [facility], [place] [address]. All rights reserved. This information is not intended as a substitute for professional medical care. Always follow your healthcare professional's instructions.           "

## 2021-07-18 ENCOUNTER — HOSPITAL ENCOUNTER (EMERGENCY)
Facility: CLINIC | Age: 56
Discharge: HOME | End: 2021-07-18
Attending: EMERGENCY MEDICINE | Admitting: EMERGENCY MEDICINE
Payer: COMMERCIAL

## 2021-07-18 ENCOUNTER — APPOINTMENT (OUTPATIENT)
Dept: ULTRASOUND IMAGING | Facility: CLINIC | Age: 56
End: 2021-07-18
Attending: EMERGENCY MEDICINE
Payer: COMMERCIAL

## 2021-07-18 VITALS
HEART RATE: 85 BPM | SYSTOLIC BLOOD PRESSURE: 157 MMHG | OXYGEN SATURATION: 98 % | WEIGHT: 245 LBS | RESPIRATION RATE: 18 BRPM | BODY MASS INDEX: 42.05 KG/M2 | TEMPERATURE: 98.7 F | DIASTOLIC BLOOD PRESSURE: 65 MMHG

## 2021-07-18 DIAGNOSIS — L03.116 CELLULITIS OF LEFT LOWER LEG: ICD-10-CM

## 2021-07-18 DIAGNOSIS — I10 ESSENTIAL HYPERTENSION: ICD-10-CM

## 2021-07-18 DIAGNOSIS — I10 BENIGN ESSENTIAL HYPERTENSION: ICD-10-CM

## 2021-07-18 PROCEDURE — 93971 EXTREMITY STUDY: CPT | Mod: LT

## 2021-07-18 PROCEDURE — 250N000013 HC RX MED GY IP 250 OP 250 PS 637: Performed by: EMERGENCY MEDICINE

## 2021-07-18 PROCEDURE — 99284 EMERGENCY DEPT VISIT MOD MDM: CPT | Mod: 25

## 2021-07-18 RX ORDER — CEPHALEXIN 500 MG/1
500 CAPSULE ORAL ONCE
Status: COMPLETED | OUTPATIENT
Start: 2021-07-18 | End: 2021-07-18

## 2021-07-18 RX ORDER — CEPHALEXIN 500 MG/1
500 CAPSULE ORAL 4 TIMES DAILY
Qty: 28 CAPSULE | Refills: 0 | Status: SHIPPED | OUTPATIENT
Start: 2021-07-18 | End: 2021-07-25

## 2021-07-18 RX ORDER — ACETAMINOPHEN 325 MG/1
650 TABLET ORAL ONCE
Status: COMPLETED | OUTPATIENT
Start: 2021-07-18 | End: 2021-07-18

## 2021-07-18 RX ORDER — LOSARTAN POTASSIUM 50 MG/1
50 TABLET ORAL DAILY
Qty: 30 TABLET | Refills: 0 | Status: SHIPPED | OUTPATIENT
Start: 2021-07-18 | End: 2021-11-29

## 2021-07-18 RX ADMIN — ACETAMINOPHEN 650 MG: 325 TABLET, FILM COATED ORAL at 09:31

## 2021-07-18 RX ADMIN — CEPHALEXIN 500 MG: 500 CAPSULE ORAL at 09:31

## 2021-07-18 ASSESSMENT — ENCOUNTER SYMPTOMS: FEVER: 0

## 2021-07-18 NOTE — ED PROVIDER NOTES
History   Chief Complaint:  Rash     HPI   Liss Berg is a 56 year old female with history of colon cancer, cellulitis, hypertension, and hyperlipidemia who presents with rash. She reports that she is here for cellulitis on her left lower leg that started at 11 PM last night. She reports that the left leg hurts especially when standing. She notes left groin tightness. She denies knee pain. She denies injury to the left leg. She denies a fever. She denies abnormal leg swelling. She reports that she is not currently taking her normal blood pressure medication, Cozaar and Lopressor because she ran out months ago.     Review of Systems   Constitutional: Negative for fever.   Cardiovascular: Negative for leg swelling.   Musculoskeletal:        Positive leg (L) pain  Positive groin (L) tightness   Negative knee pain    Skin: Positive for rash.   All other systems reviewed and are negative.    Allergies:  Morphine  Penicillins    Medications:  Albuterol   Iron   Motrin     Past Medical History:    Asthma   Heart murmur   Hypertension   Morbid obesity  Hypertrophic cardiomyopathy   Colorectal carcinoma  Colon cancer   Transient thrombocytopenia   Anemia   Cellulitis   Depressive disorder  Impaired fasting glucose   Hyperlipidemia   Hypothyroidism   Sepsis with acute organ dysfunction   Pneumonia     Past Surgical History:    Colectomy right  Insert port vascular access  Laparoscopic assisted colectomy   Remove port vascular access   Tubal ligation   Appendectomy   Excision pilonidal cyst/ sinus   Right leg surgery     Family History:    Bronchitis  COPD  Hypertension  Depression  Parathyroid problem     Social History:  Present alone.  PCP Alina Mahmood MD.    Physical Exam     Patient Vitals for the past 24 hrs:   BP Temp Temp src Pulse Resp SpO2 Weight   07/18/21 1145 -- -- -- -- -- 98 % --   07/18/21 1130 (!) 157/65 -- -- 85 -- 98 % --   07/18/21 1119 -- 98.7  F (37.1  C) Temporal -- -- -- --   07/18/21  1118 -- -- -- -- -- 97 % --   07/18/21 1117 (!) 150/68 -- -- 89 -- -- --   07/18/21 1115 (!) 150/68 -- -- -- -- -- --   07/18/21 0930 -- -- -- -- -- 98 % --   07/18/21 0915 -- -- -- -- -- 100 % --   07/18/21 0912 -- -- -- 97 -- -- --   07/18/21 0900 (!) 177/85 -- -- -- -- -- --   07/18/21 0844 (!) 172/89 98.9  F (37.2  C) Temporal 103 18 98 % 111.1 kg (245 lb)       Physical Exam  Constitutional: Vital signs reviewed as above.   Head: No external signs of trauma noted.  Eyes: Pupils are equal, round, and reactive to light.   Neck: No JVD noted  Cardiovascular: Normal rate at the time of my exam, regular rhythm and normal heart sounds.  No murmur heard. Equal B/L peripheral pulses.  Pulmonary/Chest: Effort normal and breath sounds normal. No respiratory distress. Patient has no wheezes. Patient has no rales.   Gastrointestinal: Soft. There is no tenderness.   Musculoskeletal/Extremities: No pitting edema noted in either lower extremity.  There is some tenderness at the left groin.  No abscess or fluctuance noted.    Neurological: Patient is alert and oriented to person, place, and time.   Skin: Skin is warm and dry. There is no diaphoresis noted.  There is an area of erythema noted especially on the anterior surface of the distal left lower extremity.  There does not appear to be any direct calf tenderness.  No other rashes noted on the left lower extremity and specifically no vesicular rashes noted even at the left groin site of discomfort for the patient.  Psychiatric: The patient appears calm.    Emergency Department Course   Imaging:  US Lower Extremity Venous Duplex Left:  1.  No deep venous thrombosis in the left lower extremity.  as per radiology.     Emergency Department Course:    Reviewed:  I reviewed nursing notes, vitals, past medical history and care everywhere    Assessments/ Consults:  ED Course as of Jul 18 1217   Sun Jul 18, 2021   0900 I obtained history and examined the patient as noted above.        1148 I rechecked the patient.          Interventions:  Medications   cephALEXin (KEFLEX) capsule 500 mg (500 mg Oral Given 7/18/21 0931)   acetaminophen (TYLENOL) tablet 650 mg (650 mg Oral Given 7/18/21 0931)       Disposition:  The patient was discharged to home.       Impression & Plan     CMS Diagnoses: None    Medical Decision Making:  This 56-year-old female patient presents to the ED due to concern for cellulitis of the left lower extremity.  Please see the HPI and exam for specifics.  A broad differential was considered.  The patient had focal tenderness of the left groin as well as some soreness to the erythematous area on the distal left shin.  The patient does have a history of cancer though is not currently on chemotherapy.  DVT is in the differential but this would be far more likely if she was on chemotherapy.  The patient states she is on her feet a lot and some of her discomfort may be due to that.  Ultrasound imaging was pursued and fortunately is negative for DVT.  I will plan on treating the patient with Keflex and discharging her to follow-up in the outpatient setting.  The patient also notes that she has been out of her losartan and metoprolol for quite some time.  Previous doses in the chart note 50 mg daily of losartan and 100 mg twice daily of metoprolol.  The patient's blood pressure is elevated here though I have some hesitancy starting her back on both of those former doses.  I will plan on restarting 50 mg of Cozaar daily and she can talk with her primary care clinic about reassessing blood pressure and need for additional medications.  Anticipatory guidance given prior to discharge.     Covid-19  Liss RICHMOND Jacksonon was evaluated during a global COVID-19 pandemic, which necessitated consideration that the patient might be at risk for infection with the SARS-CoV-2 virus that causes COVID-19.   Applicable protocols for evaluation were followed during the patient's care.   COVID-19 was  considered as part of the patient's evaluation.     Diagnosis:    ICD-10-CM    1. Cellulitis of left lower leg  L03.116    2. Essential hypertension  I10    3. Benign essential hypertension  I10 losartan (COZAAR) 50 MG tablet       Discharge Medications:  Discharge Medication List as of 7/18/2021 12:00 PM      START taking these medications    Details   cephALEXin (KEFLEX) 500 MG capsule Take 1 capsule (500 mg) by mouth 4 times daily for 7 days, Disp-28 capsule, R-0, E-Prescribe             Scribe Disclosure:  I, Allegra Taylor, am serving as a scribe at 8:51 AM on 7/18/2021 to document services personally performed by Ted Sandy DO based on my observations and the provider's statements to me.        eTd Sandy DO  07/18/21 1219

## 2021-07-18 NOTE — DISCHARGE INSTRUCTIONS
What do you do next:   You noted that you have been out of your metoprolol and Cozaar for quite some time.  We talked about restarting these medications but I think that starting them in a stepwise fashion would be more nathan.  I will represcribed your Cozaar (50 mg once daily) and you can recheck your blood pressure with your primary care office and discuss the need for additional medications.  I have also prescribed an antibiotic for your cellulitis.  Take this as directed.  Follow up as indicated below    When do you return: If you have uncontrollable pain, redness that continues to spread after being on your antibiotics for 2 days, bluish or purple color change of the lower extremity, swelling of the left lower extremity, or any other symptoms that concern you, please return to the ED for reevaluation.    Thank you for allowing us to care for you today.

## 2021-07-18 NOTE — ED TRIAGE NOTES
Woke up at 11 last night, with pain at ankle. Shortly after started having chills and the pain had moved up her shin. Feels tightness in left groin.   Hx of cellulitis.

## 2021-07-18 NOTE — LETTER
July 18, 2021      To Whom It May Concern:      Liss Berg was seen in our Emergency Department today, 07/18/21.  I expect her condition to improve over the next 1-2 days.  She may return to work when improved.  If her symptoms last longer than July 20, 2021, she should see her primary care clinic for additional work release information.      Sincerely,            Ted Sandy, DO

## 2021-07-27 ENCOUNTER — HOSPITAL ENCOUNTER (EMERGENCY)
Facility: CLINIC | Age: 56
Discharge: HOME | End: 2021-07-27
Attending: EMERGENCY MEDICINE | Admitting: EMERGENCY MEDICINE
Payer: COMMERCIAL

## 2021-07-27 VITALS
RESPIRATION RATE: 20 BRPM | TEMPERATURE: 97.3 F | OXYGEN SATURATION: 99 % | SYSTOLIC BLOOD PRESSURE: 167 MMHG | DIASTOLIC BLOOD PRESSURE: 88 MMHG | HEART RATE: 85 BPM | BODY MASS INDEX: 42 KG/M2 | WEIGHT: 244.71 LBS

## 2021-07-27 DIAGNOSIS — B37.31 YEAST INFECTION OF THE VAGINA: ICD-10-CM

## 2021-07-27 DIAGNOSIS — L03.116 LEFT LEG CELLULITIS: ICD-10-CM

## 2021-07-27 PROCEDURE — 99282 EMERGENCY DEPT VISIT SF MDM: CPT

## 2021-07-27 RX ORDER — NYSTATIN AND TRIAMCINOLONE ACETONIDE 100000; 1 [USP'U]/G; MG/G
CREAM TOPICAL 3 TIMES DAILY
Qty: 30 G | Refills: 0 | Status: SHIPPED | OUTPATIENT
Start: 2021-07-27 | End: 2021-08-26

## 2021-07-27 RX ORDER — SULFAMETHOXAZOLE/TRIMETHOPRIM 800-160 MG
2 TABLET ORAL 2 TIMES DAILY
Qty: 40 TABLET | Refills: 0 | Status: SHIPPED | OUTPATIENT
Start: 2021-07-27 | End: 2021-11-29

## 2021-07-27 RX ORDER — SULFAMETHOXAZOLE/TRIMETHOPRIM 800-160 MG
2 TABLET ORAL 2 TIMES DAILY
Qty: 40 TABLET | Refills: 0 | Status: SHIPPED | OUTPATIENT
Start: 2021-07-27 | End: 2021-07-27

## 2021-07-27 RX ORDER — CEPHALEXIN 500 MG/1
500 CAPSULE ORAL 4 TIMES DAILY
Qty: 28 CAPSULE | Refills: 0 | Status: SHIPPED | OUTPATIENT
Start: 2021-07-27 | End: 2021-08-03

## 2021-07-27 NOTE — ED TRIAGE NOTES
Patient seen a week ago for cellulitis to left leg. Patient finished regimen of Abx and feels it is getting worse and spreading. Denies fevers and sweats.

## 2021-07-27 NOTE — DISCHARGE INSTRUCTIONS
You should take both the Bactrim and the Keflex and keep an eye on your leg.  Should you have rapidly spreading redness, develop fever or body aches and flulike symptoms, you should return the emergency department immediately.  Otherwise, give antibiotics 24 to 48 hours to see if it helps decrease the size of your redness, if thereafter it continues to spread you should return the emergency department.  You should apply the nystatin to the areas of irritation on your periarea.

## 2021-07-27 NOTE — ED PROVIDER NOTES
History     Chief Complaint:  Cellulitis    HPI   Liss Berg is a 56 year old female past medical history significant for borderline diabetes, hypertension, asthma presenting for evaluation of left lower leg redness.  She was seen on the 18th of this month started on a course of Keflex which improved the redness by about 50% but has not resolved it.  She denies any fevers or chills.  She denies any significant leg pain but has had small amount of increased swelling in the ankle.  She can bear weight without issue.  She also endorses vaginal rash without discharge or urinary symptoms.    Review of Systems  10 Point review of systems completed, negative except as in get in HPI.    Allergies:  Morphine  Penicillins    Medications:    albuterol  Duoneb   losartan  metoprolol tartrate   montelukast   Trazodone     Past Medical History:    Asthma   Heart murmur   Hypertension   Hypertrophic cardiomyopathy  Obesity  Chronic left shoulder pain  Cellulitis  Colorectal carcinoma      Past Surgical History:    Colectomy right   Insert port vascular access    Family History:    Father: bronchitis    Social History:  Patient presents alone.  Patient has a son.     Physical Exam     Patient Vitals for the past 24 hrs:   BP Temp Temp src Pulse Resp SpO2 Weight   07/27/21 1518 -- -- -- -- -- -- 111 kg (244 lb 11.4 oz)   07/27/21 1324 (!) 167/88 97.3  F (36.3  C) Temporal 85 20 99 % --     Physical Exam  Constitutional: Alert, attentive, GCS 15  HENT:    Nose: Nose normal.    Mouth/Throat: Oropharynx is clear, mucous membranes are moist.   Eyes: EOM are normal, anicteric, conjugate gaze  CV: regular rate and rhythm; no murmurs  Chest: Effort normal and breath sounds clear without wheezing or rales, symmetric bilaterally   : redness to perineum, flaking skin  GI:  non tender. No distension. No guarding or rebound.    MSK: Trace swelling of the of the very distal left lower leg medially, no tenderness to palpation of  BLE.  Neurological: Alert, attentive, moving all extremities equally.   Skin: Mildly warm, erythematous blanching area left lower leg (50% smaller) than patient reported 9 days ago.    Emergency Department Course     Emergency Department Course:    Reviewed:  I reviewed nursing notes, vitals, past history and care everywhere    Assessments:  1430 I obtained history and examined the patient as noted above.     1604 I rechecked the patient and explained findings.     Disposition:  The patient was discharged to home.    Impression & Plan      Medical Decision Making:  Liss Berg is a 56 year old female with history of prediabetes presenting for evaluation of leg lower leg redness consistent with cellulitis. She was seen in the ED 9 days prior for the same. She was started on Keflex and had 50% regression of her redness, however, she ran out of her Keflex prompting her re-visit. She does not have any systemic signs of infection, given it was effective but did not resolve it, I will continue the Keflex and add in Bactrim for more aggressive coverage. She did develop a perineal yeast infection from the antibiotics and I will prescribe her a Nystatin cream for this. Return precautions were reviewed including worsening redness, fever, chills, flu like illness, and the edges of the redness were demarcated. I do not suspect a DVT.    Diagnosis:    ICD-10-CM    1. Left leg cellulitis  L03.116    2. Yeast infection of the vagina  B37.3      Discharge Medications:  Discharge Medication List as of 7/27/2021  4:08 PM      START taking these medications    Details   cephALEXin (KEFLEX) 500 MG capsule Take 1 capsule (500 mg) by mouth 4 times daily for 7 days, Disp-28 capsule, R-0, E-Prescribe      nystatin-triamcinolone (MYCOLOG II) 057662-2.1 UNIT/GM-% external cream Apply topically 3 times dailyDisp-30 g, [id number]           Adarsh Kulkarni MD  [employer]  10:35 PM 07/27/21      Scribe Disclosure:  I, Kelli Spencer, am serving as a scribe at 2:39 PM on 7/27/2021 to document services personally performed by Adarsh Kulkarni MD based on my observations and the provider's statements to me.     I, Orla Severson, am serving as a scribe at 2:39 PM on 7/27/2021 to document services personally performed by Adarsh Kulkarni MD based on my observations and the provider's statements to me.      Adarsh Kulkarni MD  07/27/21 9832

## 2021-07-28 ENCOUNTER — NURSE TRIAGE (OUTPATIENT)
Dept: INTERNAL MEDICINE | Facility: CLINIC | Age: 56
End: 2021-07-28

## 2021-07-28 NOTE — TELEPHONE ENCOUNTER
S-(situation): widespread rash on legs and trunk, hives on palms of hands and bottoms of feet.    B-(background): Patient with cellulitis left lower leg, seen in ER 7/18 and 7/27/21.  Is on second course of Cephalexin and new course of Bactrim    A-(assessment): took first dose of Bactrim last evening and second dose this morning before realizing she had developed rash and hives.  Palms of hands and bottoms of feet feel swollen and painful.  Denies rash or swelling on face.  Denies swelling of eyes, lips, tongue or throat though states tongue feels burned.  No wheezing, difficulty swallowing or difficulty talking.    R-(recommendations): Recommend patient not take any more antibiotics for the time being and be seen in UC/ER now.  She agrees.  CARLOS ALBERTO Mixon R.N.      Reason for Disposition    Widespread itching and onset > 2 hours after exposure to high-risk allergen (e.g., sting, nuts, 1st dose of antibiotic)    Hives suspected (urticaria, itchy pink bumps with pale centers that come and go over minutes to hours)    Taking a new medicine now or within last 3 days (Exception: antihistamine, decongestant or other OTC cough/cold medicines)    Rash beginning within 4 hours of a new prescription medication    Patient wants to be seen    Hives or itching    Additional Information    Negative: Life-threatening reaction in the past to similar substance (e.g., food, insect bite/sting, medication, etc.) and < 2 hours since exposure    Negative: Wheezing, stridor, hoarseness, or difficulty breathing    Negative: Tightness in the chest or throat and begins within 2 hours of exposure to allergic substance    Negative: Difficulty swallowing, drooling, or slurred speech    Negative: Difficult to awaken or acting confused (e.g., disoriented, slurred speech)    Negative: Unresponsive, passed out, or very weak    Negative: Other symptom of severe allergic reaction (Exception: Hives or facial swelling alone)    Negative: Sounds like a  life-threatening emergency to the triager    Negative: Life-threatening reaction (anaphylaxis) in the past to similar substance (e.g., food, insect bite/sting, chemical, etc.) and < 2 hours since exposure    Negative: Difficulty breathing or wheezing    Negative: Difficulty swallowing or slurred speech and sudden onset    Negative: Sounds like a life-threatening emergency to the triager    Negative: Insect bites suspected    Negative: Difficulty breathing or wheezing now    Negative: Rapid onset of swollen tongue    Negative: Rapid onset of hoarseness or cough    Negative: Very weak (can't stand)    Negative: Difficult to awaken or acting confused (e.g., disoriented, slurred speech)    Negative: Life-threatening reaction (anaphylaxis) in the past to similar substance (e.g., food, insect bite/sting, chemical, etc.) and < 2 hours since exposure    Negative: Sounds like a life-threatening emergency to the triager    Negative: Bee, wasp, or yellow jacket sting within last 24 hours    Negative: Rash is only on 1 part of the body (localized)    Negative: Rash started more than 3 days after stopping new prescription medicine    Negative: Swollen tongue    Negative: Widespread hives and onset < 2 hours of exposure to 1st dose of drug    Negative: Patient sounds very sick or weak to the triager    Negative: Fever    Negative: Face or lip swelling    Negative: Purple or blood-colored spots or dots (no fever and sounds well to triager)    Negative: Joint pain or swelling    Negative: Bloody crusts on lips or in mouth    Negative: Large or small blisters on skin (i.e., fluid filled bubbles or sacs)    Negative: Pregnant    Negative: Doesn't match the SYMPTOMS of hives    Negative: Widespread hives, itching, or facial swelling and onset < 2 hours of exposure to high-risk allergen (e.g., 1st dose of antibiotic, nuts, sting)    Negative: Swollen tongue    Negative: Patient sounds very sick or weak to the triager    Negative:  "MODERATE-SEVERE hives persist (i.e., hives interfere with normal activities or work) and taking antihistamine (e.g., Benadryl, Claritin) > 24 hours    Negative: Hives have become worse and taking oral steroids (e.g., prednisone) > 24 hours    Negative: Abdominal pain    Negative: Joint swelling    Negative: Fever    Answer Assessment - Initial Assessment Questions  1. MAIN SYMPTOM: \"What is your main symptom?\" \"How bad is it?\"        Hands and feet feel swollen and painful, rash on back is itching, appears as a fine scattered rash  2. RESPIRATORY STATUS: \"Are you having difficulty breathing?\"  (e.g., yes/no, wheezing, unable to complete a sentence)       Denies difficulty breathing  3. SWALLOWING: \"Can you swallow?\" (e.g., yes/no, food, fluid, saliva)       No difficulty swallowing  4. VASCULAR STATUS: \"Are you feeling weak?\" If so, ask: \"Can you stand and walk normally?\"      no  5. ONSET: \"When did the reaction start?\" (Minutes or hours ago)       Onset about 5:30 a.m. today  6. SUBSTANCE: \"What are you reacting to?\" \"When did the contact occur?\"       Believes she is reacting to Bactrim but is also on second course of cephalexin  7. PREVIOUS REACTION: \"Have you ever reacted to it before?\" If so, ask: \"What happened that time?\"      Penicillin causes hives  8. EPINEPHRINE: \"Do you have an epinephrine autoinjector (e.g., EpiPen)?\"      no    Answer Assessment - Initial Assessment Questions  1. APPEARANCE: \"What does the rash look like?\"       Fine scattered rash on legs and trunk.  Hives on palms of hands and bottoms of feet  2. LOCATION: \"Where is the rash located?\"       As above  3. NUMBER: \"How many hives are there?\"       Numerous on hands and feet, unable to count  4. SIZE: \"How big are the hives?\" (inches, cm, compare to coins) \"Do they all look the same or is there lots of variation in shape and size?\"       Unable to tell  5. ONSET: \"When did the hives begin?\" (Hours or days ago)       About 5:30 am. " "today  6. ITCHING: \"Does it itch?\" If so, ask: \"How bad is the itch?\"     - MILD: doesn't interfere with normal activities    - MODERATE-SEVERE: interferes with work, school, sleep, or other activities       Intense itching  7. RECURRENT PROBLEM: \"Have you had hives before?\" If so, ask: \"When was the last time?\" and \"What happened that time?\"       Had hives in the past from Penicillin  8. TRIGGERS: \"Were you exposed to any new food, plant, cosmetic product or animal just before the hives began?\"      Is on 2nd course of Cephalexin and just started Bactrim yesterday  9. OTHER SYMPTOMS: \"Do you have any other symptoms?\" (e.g., fever, tongue swelling, difficulty breathing, abdominal pain)      Denies fever, swelling of tongue, throat, lips or eyes.  Denies abdominal pain, wheezing or difficulty breathing  10. PREGNANCY: \"Is there any chance you are pregnant?\" \"When was your last menstrual period?\"        NA    Protocols used: [id number], ITCHING - WIDESPREAD-A-OH, HIVES-A-OH, RASH - WIDESPREAD ON DRUGS-A-OH      "

## 2021-11-17 ENCOUNTER — OFFICE VISIT (OUTPATIENT)
Dept: URGENT CARE | Facility: URGENT CARE | Age: 56
End: 2021-11-17
Payer: COMMERCIAL

## 2021-11-17 VITALS
DIASTOLIC BLOOD PRESSURE: 90 MMHG | TEMPERATURE: 96.9 F | OXYGEN SATURATION: 98 % | RESPIRATION RATE: 12 BRPM | SYSTOLIC BLOOD PRESSURE: 167 MMHG | HEART RATE: 70 BPM

## 2021-11-17 DIAGNOSIS — L03.114 CELLULITIS OF LEFT UPPER EXTREMITY: Primary | ICD-10-CM

## 2021-11-17 PROCEDURE — 99214 OFFICE O/P EST MOD 30 MIN: CPT | Mod: 25 | Performed by: FAMILY MEDICINE

## 2021-11-17 PROCEDURE — 96372 THER/PROPH/DIAG INJ SC/IM: CPT | Performed by: FAMILY MEDICINE

## 2021-11-17 RX ORDER — CEFTRIAXONE SODIUM 1 G
1 VIAL (EA) INJECTION ONCE
Status: COMPLETED | OUTPATIENT
Start: 2021-11-17 | End: 2021-11-17

## 2021-11-17 RX ORDER — LINEZOLID 600 MG/1
600 TABLET, FILM COATED ORAL 2 TIMES DAILY
Qty: 20 TABLET | Refills: 0 | Status: SHIPPED | OUTPATIENT
Start: 2021-11-17 | End: 2021-11-27

## 2021-11-17 RX ADMIN — Medication 1 G: at 11:33

## 2021-11-17 NOTE — LETTER
WORK NOTE    Date: 11/17/2021      Name: Liss Berg                       YOB: 1965    Medical Record Number: [id number]    The patient was seen at:  [facility]    Patient seen today for acute illness.  Please excuse from work until Saturday when we anticipate her return without restrictions.        _________________________  Maria Teresa Loomis MD

## 2021-11-17 NOTE — PROGRESS NOTES
Assessment & Plan     Cellulitis of left upper extremity    - linezolid (ZYVOX) 600 MG tablet; Take 1 tablet (600 mg) by mouth 2 times daily for 10 days  - cefTRIAXone (ROCEPHIN) injection 1 g    Rocephin 1 g IM x 1 today with hx of recent hospitalization for LE cellulitis.  Will cover with po Zyvox which was what worked for her at time of discharge from hospital in July (? If allergic to Keflex and/or Bactrim, too?)  Continue with hot packing.   Understands need for very close Follow-up if any change or worsening in next 24-48 hours after meds are started.    Maria Teresa Loomis MD  [facility]    Clifford Leija is a 56 year old who presents for the following health issues     HPI     Patient was treated for LLE cellulitis this summer- first started on Keflex with poor resolution.  Bactrim was added to the Keflex and patient broke out in rash.  Was treated in hospital with IV linezolid and discharged on po linezolid with good results.    Now presents with increased redness in LEFT axilla.  States it started as a pimple in LEFT armpit fold- woke up today with sudden increased pain and swelling and difficulty lifting up her LEFT UE due to pain.    No fever or chills.    Hx of colon CA 6 years ago.    Has not tried heat packing.        Review of Systems   Constitutional, HEENT, cardiovascular, pulmonary, GI, , musculoskeletal, neuro, skin, endocrine and psych systems are negative, except as otherwise noted.      Objective    BP (!) 167/90   Pulse 70   Temp 96.9  F (36.1  C) (Tympanic)   Resp 12   LMP 04/05/2016   SpO2 98%   Breastfeeding No   There is no height or weight on file to calculate BMI.  Physical Exam   GENERAL: healthy, alert and no distress  EYES: Eyes grossly normal to inspection, PERRL and conjunctivae and sclerae normal  NECK: no adenopathy, no asymmetry, masses, or scars and thyroid normal to palpation  MS: no gross musculoskeletal defects noted, no  edema  SKIN: 1 cm x 2 cm indurated, tender mass in LEFT axilla with surrounding erthema up into LEFT UE- area outlined with black pen.  No streaking.  No drainage.  No head noted.  PSYCH: mentation appears normal, affect normal/bright

## 2021-11-19 ENCOUNTER — NURSE TRIAGE (OUTPATIENT)
Dept: NURSING | Facility: CLINIC | Age: 56
End: 2021-11-19
Payer: COMMERCIAL

## 2021-11-19 NOTE — TELEPHONE ENCOUNTER
Patient received injection of antibiotic treatment in left upper arm while in urgent care on 11/17/21. Patient is now on oral antibiotics and says the red streak in the left arm is spreading. Patient says she was advised by provider to come in or call if this happens.  Triage guidelines recommend to be see today or tomorrow in office.   Caller verbalized and understands directives.  COVID 19 Nurse Triage Plan/Patient Instructions    Please be aware that novel coronavirus (COVID-19) may be circulating in the community. If you develop symptoms such as fever, cough, or SOB or if you have concerns about the presence of another infection including coronavirus (COVID-19), please contact your health care provider or visit [email or web address].     Disposition/Instructions    In-Person Visit with provider recommended. Reference Visit Selection Guide.    Thank you for taking steps to prevent the spread of this virus.  o Limit your contact with others.  o Wear a simple mask to cover your cough.  o Wash your hands well and often.    Resources    [facility]: About COVID-19: [email or web address]    CDC: What to Do If You're Sick: www.cdc.gov/coronavirus/2019-ncov/about/steps-when-sick.html    CDC: Ending Home Isolation: www.cdc.gov/coronavirus/2019-ncov/hcp/disposition-in-home-patients.html     CDC: Caring for Someone: www.cdc.gov/coronavirus/2019-ncov/if-you-are-sick/care-for-someone.html     [facility]: Interim Guidance for Hospital Discharge to Home: www.health.[facility].mn.us/diseases/coronavirus/hcp/hospdischarge.pdf    [facility] clinical trials (COVID-19 research studies): clinicalaffairs.[facility]/n-clinical-trials     Below are the COVID-19 hotlines at the [facility] of Health ([facility]). Interpreters are available.   o For health questions: Call 631-169-9382 or 1-104.723.2113 (7 a.m. to 7 p.m.)  o For questions about schools and childcare: Call 625-488-3861 or 1-396.725.7605 (7 a.m. to  7 p.m.)                     Reason for Disposition    Patient wants to be seen    Additional Information    Negative: Severe difficulty breathing (e.g., struggling for each breath, speaks in single words)    Negative: Sounds like a life-threatening emergency to the triager    Negative: Sinus infection and taking an antibiotic    Negative: Wound infection and taking an antibiotic    Negative: MODERATE difficulty breathing (e.g., speaks in phrases, SOB even at rest, pulse 100-120)    Negative: Fever > 103 F (39.4 C)    Negative: Patient sounds very sick or weak to the triager    Negative: Finished taking antibiotics and symptoms are BETTER but are not completely gone    Protocols used: INFECTION ON ANTIBIOTIC FOLLOW-UP CALL-A-[place]

## 2021-11-21 ENCOUNTER — OFFICE VISIT (OUTPATIENT)
Dept: URGENT CARE | Facility: URGENT CARE | Age: 56
End: 2021-11-21
Payer: COMMERCIAL

## 2021-11-21 VITALS
OXYGEN SATURATION: 100 % | TEMPERATURE: 100.2 F | HEART RATE: 87 BPM | DIASTOLIC BLOOD PRESSURE: 100 MMHG | SYSTOLIC BLOOD PRESSURE: 160 MMHG | RESPIRATION RATE: 16 BRPM | WEIGHT: 237.3 LBS | BODY MASS INDEX: 40.73 KG/M2

## 2021-11-21 DIAGNOSIS — M79.622 PAIN OF LEFT UPPER ARM: Primary | ICD-10-CM

## 2021-11-21 DIAGNOSIS — L02.412 ABSCESS OF AXILLA, LEFT: ICD-10-CM

## 2021-11-21 DIAGNOSIS — R50.9 FEVER IN ADULT: ICD-10-CM

## 2021-11-21 LAB
BASOPHILS # BLD AUTO: 0 10E3/UL (ref 0–0.2)
BASOPHILS NFR BLD AUTO: 0 %
EOSINOPHIL # BLD AUTO: 0.3 10E3/UL (ref 0–0.7)
EOSINOPHIL NFR BLD AUTO: 3 %
ERYTHROCYTE [DISTWIDTH] IN BLOOD BY AUTOMATED COUNT: 13.1 % (ref 10–15)
HCT VFR BLD AUTO: 40.2 % (ref 35–47)
HGB BLD-MCNC: 13.4 G/DL (ref 11.7–15.7)
LYMPHOCYTES # BLD AUTO: 2.1 10E3/UL (ref 0.8–5.3)
LYMPHOCYTES NFR BLD AUTO: 24 %
MCH RBC QN AUTO: 28.8 PG (ref 26.5–33)
MCHC RBC AUTO-ENTMCNC: 33.3 G/DL (ref 31.5–36.5)
MCV RBC AUTO: 86 FL (ref 78–100)
MONOCYTES # BLD AUTO: 0.9 10E3/UL (ref 0–1.3)
MONOCYTES NFR BLD AUTO: 10 %
NEUTROPHILS # BLD AUTO: 5.5 10E3/UL (ref 1.6–8.3)
NEUTROPHILS NFR BLD AUTO: 63 %
PLATELET # BLD AUTO: 284 10E3/UL (ref 150–450)
RBC # BLD AUTO: 4.66 10E6/UL (ref 3.8–5.2)
WBC # BLD AUTO: 8.8 10E3/UL (ref 4–11)

## 2021-11-21 PROCEDURE — 36415 COLL VENOUS BLD VENIPUNCTURE: CPT | Performed by: FAMILY MEDICINE

## 2021-11-21 PROCEDURE — 85025 COMPLETE CBC W/AUTO DIFF WBC: CPT | Performed by: FAMILY MEDICINE

## 2021-11-21 PROCEDURE — 99213 OFFICE O/P EST LOW 20 MIN: CPT | Performed by: FAMILY MEDICINE

## 2021-11-21 RX ORDER — TRAMADOL HYDROCHLORIDE 50 MG/1
50 TABLET ORAL EVERY 6 HOURS PRN
Qty: 10 TABLET | Refills: 0 | Status: SHIPPED | OUTPATIENT
Start: 2021-11-21 | End: 2021-11-24

## 2021-11-21 NOTE — PROGRESS NOTES
Chief Complaint   Patient presents with     Cellulitis     left arm started 11/15     Initial differential diagnosis included but was not restricted to   Differential Diagnosis:  Skin abscess, infected sebaceous cyst , skin cellulitis   Medical Decision Making:    ASSESMENT AND PLAN   Liss was seen today for cellulitis.    Diagnoses and all orders for this visit:    Pain of left upper arm    Fever in adult  -     CBC with platelets and differential; Future  -     CBC with platelets and differential    Abscess of axilla, left  -     Adult General Surg Referral; Future  -     traMADol (ULTRAM) 50 MG tablet; Take 1 tablet (50 mg) by mouth every 6 hours as needed for severe pain          Advised to follow up with surgery for draining the abscess   As its large and she is indurated and hard its not ready to be drained now   Advised to continue on the antibiotic   Continue on warm packs   Was given a note for work , to be off for next 3 days   Routine discharge counseling was given to the patient and the patient understands that worsening, changing or persistent symptoms should prompt an immediate call or follow up with their primary physician or the emergency department. The importance of appropriate follow up was also discussed with the patient.     I have reviewed the nursing notes.  Review of the result(s) of each unique test -      Time  spent on the date of the encounter doing chart review, review of test results, interpretation of tests, patient visit and documentation   see orders in Epic  Pt verbalized and agreed with the plan and is aware of the worsening symptoms for which would need to follow up .  Pt was stable during time of discharge from the clinic     SUBJECTIVE     Liss Berg is a 56 year old female presenting with a chief complaint of    Chief Complaint   Patient presents with     Cellulitis     left arm started 11/15           Liss Berg is a 56 year old female presenting with a  chief complaint of left armpit abscess   She is being treated with linezolid as that worked for her cellulitis   She reacted to bactrim and keflex possible rash  She is an established patient of [facility].  Onset of symptoms was 5 day(s) ago.  Course of illness is worsening. With pain in the left armpit area    Severity moderate  Current and Associated symptoms: pain in the armpit area   Cellulitis improved       Past Medical History:   Diagnosis Date     Asthma      Heart murmur      Hypertension      Current Outpatient Medications   Medication Sig Dispense Refill     traMADol (ULTRAM) 50 MG tablet Take 1 tablet (50 mg) by mouth every 6 hours as needed for severe pain 10 tablet 0     albuterol (PROAIR HFA, PROVENTIL HFA, VENTOLIN HFA) 108 (90 BASE) MCG/ACT inhaler Inhale 2 puffs into the lungs every 6 hours as needed for shortness of breath / dyspnea or wheezing 1 Inhaler 1     albuterol (PROVENTIL) (2.5 MG/3ML) 0.083% neb solution Take 1 vial (2.5 mg) by nebulization every 6 hours as needed for shortness of breath / dyspnea or wheezing 1 Box 0     dexamethasone (DECADRON) 4 MG/ML injection Inject 1 mL (4 mg) as directed once for 1 dose 1 mL 0     ferrous sulfate (IRON) 325 (65 FE) MG tablet Take 1 tablet (325 mg) by mouth daily (with breakfast) 90 tablet 0     ipratropium - albuterol 0.5 mg/2.5 mg/3 mL (DUONEB) 0.5-2.5 (3) MG/3ML neb solution Take 1 vial (3 mLs) by nebulization every 6 hours as needed for shortness of breath / dyspnea or wheezing 1 Box 0     linezolid (ZYVOX) 600 MG tablet Take 1 tablet (600 mg) by mouth 2 times daily for 10 days 20 tablet 0     losartan (COZAAR) 50 MG tablet Take 1 tablet (50 mg) by mouth daily (Patient not taking: Reported on 11/17/2021) 30 tablet 0     metoprolol tartrate (LOPRESSOR) 100 MG tablet Take 1 tablet (100 mg) by mouth 2 times daily (Patient not taking: Reported on 11/17/2021) 60 tablet 0     montelukast (SINGULAIR) 10 MG tablet Take 1 tablet (10 mg) by mouth At  Bedtime 30 tablet 0     order for DME nebulizer 1 Device 0     oxyCODONE IR (ROXICODONE) 5 MG tablet Take 1 tablet (5 mg) by mouth every 3 hours as needed for pain or other (Moderate to Severe) (Patient not taking: Reported on 11/17/2021) 6 tablet 0     sulfamethoxazole-trimethoprim (BACTRIM DS) 800-160 MG tablet Take 2 tablets by mouth 2 times daily (Patient not taking: Reported on 11/17/2021) 40 tablet 0     traZODone (DESYREL) 50 MG tablet Take 0.5-2 tablets ( mg) by mouth nightly as needed for sleep (Patient not taking: Reported on 11/17/2021) 90 tablet 1     Social History     Tobacco Use     Smoking status: Never Smoker     Smokeless tobacco: Never Used   Substance Use Topics     Alcohol use: Yes     Alcohol/week: 0.0 standard drinks     Comment: less that once per month - very rare     Family History   Problem Relation Age of Onset     Bronchitis Father          ROS:    10 point ROS of systems including Constitutional, Eyes, Respiratory, Cardiovascular, Gastroenterology, Genitourinary,Muscularskeletal, Psychiatric ,neurological were all negative except for pertinent positives noted in my HPI         OBJECTIVE:    BP (!) 160/100 (BP Location: Right arm, Patient Position: Chair, Cuff Size: Adult Large)   Pulse 87   Temp 100.2  F (37.9  C) (Oral)   Resp 16   Wt 107.6 kg (237 lb 4.8 oz)   LMP 04/05/2016   SpO2 100%   Breastfeeding No   BMI 40.73 kg/m    GENERAL APPEARANCE: healthy, alert and no distress  EYES: EOMI,  PERRL, conjunctiva clear  SKIN: 1-2 cm lump noted with some tenderness but adjacent to it there is a 7 cm hard lump noted in the armpit area which is very  tender an has no fluctuation   She has extreme tenderness over that area   PSYCH: mentation appears normal  Physical Exam      (Note was completed, in part, with [employer] voice-recognition software. Documentation reviewed, but some grammatical, spelling, and word errors may remain.)  Natasha Mazariegos MD on 11/21/2021 at 6:50  PM

## 2021-11-21 NOTE — LETTER
[facility] [place]  [address]  [facility] [address]  Phone: [phone]  Fax: [phone]    11/21/21    Liss Berg  [address]  [facility] [address]      To whom it may concern:     Liss Berg was seen in the clinic for current condition   Needs to be off from work for next 3 days     Sincerely,      Natasha Mazariegos MD

## 2021-11-23 ENCOUNTER — OFFICE VISIT (OUTPATIENT)
Dept: SURGERY | Facility: CLINIC | Age: 56
End: 2021-11-23
Payer: COMMERCIAL

## 2021-11-23 VITALS
BODY MASS INDEX: 40.46 KG/M2 | SYSTOLIC BLOOD PRESSURE: 190 MMHG | OXYGEN SATURATION: 96 % | RESPIRATION RATE: 16 BRPM | WEIGHT: 237 LBS | HEART RATE: 79 BPM | DIASTOLIC BLOOD PRESSURE: 126 MMHG | HEIGHT: 64 IN

## 2021-11-23 DIAGNOSIS — Z11.59 ENCOUNTER FOR SCREENING FOR OTHER VIRAL DISEASES: ICD-10-CM

## 2021-11-23 DIAGNOSIS — Z11.59 ENCOUNTER FOR SCREENING FOR OTHER VIRAL DISEASES: Primary | ICD-10-CM

## 2021-11-23 DIAGNOSIS — L02.412 ABSCESS OF LEFT AXILLA: Primary | ICD-10-CM

## 2021-11-23 PROCEDURE — 99203 OFFICE O/P NEW LOW 30 MIN: CPT | Performed by: SURGERY

## 2021-11-23 ASSESSMENT — MIFFLIN-ST. JEOR: SCORE: 1650.02

## 2021-11-23 NOTE — PROGRESS NOTES
HPI:  Liss presents today for a pair of boils on her left axilla for the past 1 week. She denies trauma at to the site. She was seen in urgent care last Wednesday with cellulitis and again on Sunday when she began having left arm pain in the region of the tricep. She did not have any drainage but has used warm packs. She has been on antibiotics for about 6 days.  She also is currently off her antihypertensives. She denies any headaches or vision changes.    PE:  LMP 04/05/2016   General appearance: well-nourished, no apparent distress  Skin: There is a pair of 1-2 cm elevated boils in the left axilla. The skin overlying them is red, they are mildly tender, not draining, there is nothing palpable in the arm at the site of her discomfort.         Plan:  Will need drainage in the OR with anesthesia. Currently not making her toxic so okay to wait until next week. She will go to  today to address her BP. She knows to report to an ED if the boils increase in size or she has systemic signs of illness such as fever or chills.    New Dolan MD    Please route or send letter to:  Primary Care Provider (PCP)

## 2021-11-23 NOTE — LETTER
[facility]    [address], [address]  [place], [place] [address]  Phone [phone]  Fax [phone]    [address], [address]  [place] [address]  Phone [phone]  Fax [phone]    [email or web address]               PRE-PROCEDURE COVID TEST     Date: 11-29-21  Time:2:00 pm     Location: [facility]   [address]  [address]  [place] [address]

## 2021-11-24 ENCOUNTER — TELEPHONE (OUTPATIENT)
Dept: INTERNAL MEDICINE | Facility: CLINIC | Age: 56
End: 2021-11-24
Payer: COMMERCIAL

## 2021-11-24 ENCOUNTER — TELEPHONE (OUTPATIENT)
Dept: SURGERY | Facility: CLINIC | Age: 56
End: 2021-11-24
Payer: COMMERCIAL

## 2021-11-24 NOTE — TELEPHONE ENCOUNTER
Patient calling. She know Dr Mahmood is not here but she needs a pre op for surgery on 12/1/2021  Please advise what she should do  Ok to call and  [phone]

## 2021-11-24 NOTE — TELEPHONE ENCOUNTER
Type of surgery: INCISION AND DRAINAGE LEFT AXILLARY ABSCESS   Location of surgery: [facility]  Date and time of surgery: 12-1-21, 3:50 PM   Surgeon: DR. DORANTES   Pre-Op Appt Date: PATIENT TO [facility]  Post-Op Appt Date: PATIENT TO SCHEDULE    Packet sent out: GIVEN TO PATIENT   Pre-cert/Authorization completed:  Not Applicable  Date: 11-24-21      INCISION AND DRAINAGE LEFT AXILLARY ABSCESS   CHOICE   PT INST TO HAVE H&P WITH DR. DELGADO   30 MINS REQ  PA ASSIST RMO   ALW

## 2021-11-29 ENCOUNTER — OFFICE VISIT (OUTPATIENT)
Dept: SURGERY | Facility: CLINIC | Age: 56
End: 2021-11-29
Payer: COMMERCIAL

## 2021-11-29 ENCOUNTER — APPOINTMENT (OUTPATIENT)
Dept: LAB | Facility: CLINIC | Age: 56
End: 2021-11-29
Payer: COMMERCIAL

## 2021-11-29 ENCOUNTER — OFFICE VISIT (OUTPATIENT)
Dept: FAMILY MEDICINE | Facility: CLINIC | Age: 56
End: 2021-11-29
Payer: COMMERCIAL

## 2021-11-29 VITALS
HEIGHT: 65 IN | HEART RATE: 60 BPM | TEMPERATURE: 99.5 F | WEIGHT: 236 LBS | OXYGEN SATURATION: 96 % | SYSTOLIC BLOOD PRESSURE: 158 MMHG | BODY MASS INDEX: 39.32 KG/M2 | DIASTOLIC BLOOD PRESSURE: 100 MMHG

## 2021-11-29 DIAGNOSIS — Z01.818 PREOP GENERAL PHYSICAL EXAM: Primary | ICD-10-CM

## 2021-11-29 DIAGNOSIS — R22.32 AXILLARY LUMP, LEFT: ICD-10-CM

## 2021-11-29 DIAGNOSIS — I10 BENIGN ESSENTIAL HYPERTENSION: ICD-10-CM

## 2021-11-29 DIAGNOSIS — Z11.59 ENCOUNTER FOR SCREENING FOR OTHER VIRAL DISEASES: ICD-10-CM

## 2021-11-29 LAB
ANION GAP SERPL CALCULATED.3IONS-SCNC: 4 MMOL/L (ref 3–14)
BUN SERPL-MCNC: 9 MG/DL (ref 7–30)
CALCIUM SERPL-MCNC: 9.2 MG/DL (ref 8.5–10.1)
CHLORIDE BLD-SCNC: 108 MMOL/L (ref 94–109)
CO2 SERPL-SCNC: 28 MMOL/L (ref 20–32)
CREAT SERPL-MCNC: 0.61 MG/DL (ref 0.52–1.04)
GFR SERPL CREATININE-BSD FRML MDRD: >90 ML/MIN/1.73M2
GLUCOSE BLD-MCNC: 157 MG/DL (ref 70–99)
POTASSIUM BLD-SCNC: 3.6 MMOL/L (ref 3.4–5.3)
SARS-COV-2 RNA RESP QL NAA+PROBE: NEGATIVE
SODIUM SERPL-SCNC: 140 MMOL/L (ref 133–144)

## 2021-11-29 PROCEDURE — 99214 OFFICE O/P EST MOD 30 MIN: CPT | Performed by: INTERNAL MEDICINE

## 2021-11-29 PROCEDURE — 80048 BASIC METABOLIC PNL TOTAL CA: CPT | Performed by: INTERNAL MEDICINE

## 2021-11-29 PROCEDURE — 93000 ELECTROCARDIOGRAM COMPLETE: CPT | Performed by: INTERNAL MEDICINE

## 2021-11-29 PROCEDURE — 36415 COLL VENOUS BLD VENIPUNCTURE: CPT | Performed by: INTERNAL MEDICINE

## 2021-11-29 PROCEDURE — U0005 INFEC AGEN DETEC AMPLI PROBE: HCPCS

## 2021-11-29 PROCEDURE — 99207 PR NO CHARGE NURSE ONLY: CPT

## 2021-11-29 PROCEDURE — U0003 INFECTIOUS AGENT DETECTION BY NUCLEIC ACID (DNA OR RNA); SEVERE ACUTE RESPIRATORY SYNDROME CORONAVIRUS 2 (SARS-COV-2) (CORONAVIRUS DISEASE [COVID-19]), AMPLIFIED PROBE TECHNIQUE, MAKING USE OF HIGH THROUGHPUT TECHNOLOGIES AS DESCRIBED BY CMS-2020-01-R: HCPCS

## 2021-11-29 RX ORDER — METOPROLOL TARTRATE 100 MG
100 TABLET ORAL 2 TIMES DAILY
Qty: 180 TABLET | Refills: 1 | Status: SHIPPED | OUTPATIENT
Start: 2021-11-29

## 2021-11-29 RX ORDER — LOSARTAN POTASSIUM 100 MG/1
100 TABLET ORAL DAILY
Qty: 90 TABLET | Refills: 1 | Status: SHIPPED | OUTPATIENT
Start: 2021-11-29

## 2021-11-29 ASSESSMENT — MIFFLIN-ST. JEOR: SCORE: 1653.43

## 2021-11-29 NOTE — PROGRESS NOTES
Patient seen in clinic for asymptomatic Pre-Surgery COVID test.    Patient swabbed via Nasopharyngeal Swab.  Specimen brought to hospital lab.2    Patient educated to self-quarantine from now until surgery.

## 2021-11-29 NOTE — PROGRESS NOTES
[address] [address]  Phone: [phone]  Primary Provider: Alina Mahmood  Pre-op Performing Provider: YANELI CARTY      PREOPERATIVE EVALUATION:  Today's date: 11/29/2021    Liss Berg is a 56 year old female who presents for a preoperative evaluation.    Surgical Information:  Surgery/Procedure: Removal of lump in arm pit  Surgery Location: [facility]  Surgeon:   Surgery Date: 12/01/2021  Time of Surgery: 350 PM  Where patient plans to recover: At home with family  Fax number for surgical facility: Note does not need to be faxed, will be available electronically in Epic.    Type of Anesthesia Anticipated: General    Assessment & Plan     The proposed surgical procedure is considered INTERMEDIATE risk.    Preop general physical exam  She has history of hypertension  This seems to be poorly controlled because she does not have a PCP and was having trouble obtaining her medications  She is on losartan 50 mg and metoprolol 100 mg twice a day  I will increase the losartan to 100 mg  I gave her a blood pressure monitor prescription  She needs to monitor her blood pressure at home  Hopefully the blood pressure will be better controlled in the next 48 hours  As long as the diastolic is below 100 and systolic is below 160 we can go ahead with the surgery  She did have an EKG done today which showed sinus bradycardia and nonspecific ST segment changes  There is some ST segment flattening which could be from strain pattern caused by uncontrolled hypertension  She however has no history of any CAD or CVA  She has a history of hypertrophic cardiomyopathy on the chart however she had a MRI of her heart done in 2016 which showed no evidence of any hypertrophic or restrictive cardiomyopathy  The echo done earlier that year suggested that she could be having this however MRI did shoe the same  She was followed with  cardiology  Her exercise tolerance is very good at least 6 METS or more  - Basic metabolic panel  (Ca, Cl, CO2, Creat, Gluc, K, Na, BUN); Future  - EKG 12-lead complete w/read - Clinics  - Basic metabolic panel  (Ca, Cl, CO2, Creat, Gluc, K, Na, BUN)    Axillary lump, left  She is going to have the surgery on 1 December    Benign essential hypertension  As above she has a longstanding history of uncontrolled blood pressure  When I rechecked the blood pressure in the clinic today it was 158 systolic to 100 diastolic  Initially it was 174 systolic and 98 diastolic  Heart medication intake has been erratic recently because she does not have a PCP  Today refilled medications  Increase the losartan 200 mg  She needs to recheck her blood pressure regularly  We can proceed with surgery as long as the systolic is below 160 and diastolic is below 100  - losartan (COZAAR) 100 MG tablet; Take 1 tablet (100 mg) by mouth daily  - metoprolol tartrate (LOPRESSOR) 100 MG tablet; Take 1 tablet (100 mg) by mouth 2 times daily  - Basic metabolic panel  (Ca, Cl, CO2, Creat, Gluc, K, Na, BUN); Future  - Home Blood Pressure Monitor Order for DME - ONLY FOR DME  - Basic metabolic panel  (Ca, Cl, CO2, Creat, Gluc, K, Na, BUN)         Risks and Recommendations:  The patient has the following additional risks and recommendations for perioperative complications:   -Uncontrolled blood pressure  Medications have been adjusted today  Please see note above    Medication Instructions:  Patient is to take all scheduled medications on the day of surgery    RECOMMENDATION:  APPROVAL GIVEN to proceed with proposed procedure, without further diagnostic evaluation as long as we have reasonable control of the blood pressure as stated above              30 minutes spent on the date of the encounter doing chart review, history and exam, documentation and further activities per the note        Subjective     HPI related to upcoming procedure: Lump in her  left axilla which was getting painful  She is going to have surgery on December 1    Preop Questions 11/29/2021   1. Have you ever had a heart attack or stroke? No   2. Have you ever had surgery on your heart or blood vessels, such as a stent placement, a coronary artery bypass, or surgery on an artery in your head, neck, heart, or legs? No   3. Do you have chest pain with activity? No   4. Do you have a history of  heart failure? No   5. Do you currently have a cold, bronchitis or symptoms of other infection? No   6. Do you have a cough, shortness of breath, or wheezing? No   7. Do you or anyone in your family have previous history of blood clots? No   8. Do you or does anyone in your family have a serious bleeding problem such as prolonged bleeding following surgeries or cuts? No   9. Have you ever had problems with anemia or been told to take iron pills? No   10. Have you had any abnormal blood loss such as black, tarry or bloody stools, or abnormal vaginal bleeding? No   11. Have you ever had a blood transfusion? YES -    11a. Have you ever had a transfusion reaction? No   12. Are you willing to have a blood transfusion if it is medically needed before, during, or after your surgery? Yes   13. Have you or any of your relatives ever had problems with anesthesia? No   14. Do you have sleep apnea, excessive snoring or daytime drowsiness? No   15. Do you have any artifical heart valves or other implanted medical devices like a pacemaker, defibrillator, or continuous glucose monitor? No   16. Do you have artificial joints? No   17. Are you allergic to latex? No   18. Is there any chance that you may be pregnant? No       Health Care Directive:  Patient does not have a Health Care Directive or Living Will: Discussed advance care planning with patient; however, patient declined at this time.    Status of Chronic Conditions:  See problem list for active medical problems.  Problems all longstanding and stable, except as  noted/documented.  See ROS for pertinent symptoms related to these conditions.      Review of Systems  CONSTITUTIONAL: NEGATIVE for fever, chills, change in weight  INTEGUMENTARY/SKIN: NEGATIVE for worrisome rashes, moles or lesions  EYES: NEGATIVE for vision changes or irritation  ENT/MOUTH: NEGATIVE for ear, mouth and throat problems  RESP: NEGATIVE for significant cough or SOB  CV: NEGATIVE for chest pain, palpitations or peripheral edema  GI: NEGATIVE for nausea, abdominal pain, heartburn, or change in bowel habits  : NEGATIVE for frequency, dysuria, or hematuria  MUSCULOSKELETAL: NEGATIVE for significant arthralgias or myalgia  NEURO: NEGATIVE for weakness, dizziness or paresthesias  ENDOCRINE: NEGATIVE for temperature intolerance, skin/hair changes  HEME: NEGATIVE for bleeding problems  PSYCHIATRIC: NEGATIVE for changes in mood or affect    Patient Active Problem List    Diagnosis Date Noted     Morbid obesity (H) 07/03/2018     Priority: Medium     Hypertrophic cardiomyopathy (H) 07/03/2018     Priority: Medium     Left shoulder pain 06/22/2018     Priority: Medium     Chronic left shoulder pain 05/24/2018     Priority: Medium     Mild intermittent asthma without complication 09/20/2016     Priority: Medium     Benign essential hypertension 05/25/2016     Priority: Medium     Colorectal carcinoma (H) 05/24/2016     Priority: Medium     Abdominal pain 05/10/2016     Priority: Medium     Cellulitis of leg 08/18/2013     Priority: Medium     Cellulitis 08/18/2013     Priority: Medium      Past Medical History:   Diagnosis Date     Asthma      Heart murmur      Hypertension      Past Surgical History:   Procedure Laterality Date     COLECTOMY RIGHT Right 5/11/2016    Procedure: COLECTOMY RIGHT;  Surgeon: Miguel Angel Yost MD;  Location:  OR     INSERT PORT VASCULAR ACCESS Left 7/5/2016    Procedure: INSERT PORT VASCULAR ACCESS;  Surgeon: Juan Carlos Lopez MD;  Location:  OR     LAPAROSCOPIC ASSISTED  "COLECTOMY Right 5/11/2016    Procedure: LAPAROSCOPIC ASSISTED COLECTOMY;  Surgeon: Miguel Angel Yost MD;  Location: [facility] OR     REMOVE PORT VASCULAR ACCESS N/A 7/5/2018    Procedure: REMOVE PORT VASCULAR ACCESS;  Port-a-cath removal;  Surgeon: Miguel Angel Yost MD;  Location: [facility] OR     Current Outpatient Medications   Medication Sig Dispense Refill     losartan (COZAAR) 100 MG tablet Take 1 tablet (100 mg) by mouth daily 90 tablet 1     metoprolol tartrate (LOPRESSOR) 100 MG tablet Take 1 tablet (100 mg) by mouth 2 times daily 180 tablet 1     albuterol (PROAIR HFA, PROVENTIL HFA, VENTOLIN HFA) 108 (90 BASE) MCG/ACT inhaler Inhale 2 puffs into the lungs every 6 hours as needed for shortness of breath / dyspnea or wheezing 1 Inhaler 1     albuterol (PROVENTIL) (2.5 MG/3ML) 0.083% neb solution Take 1 vial (2.5 mg) by nebulization every 6 hours as needed for shortness of breath / dyspnea or wheezing 1 Box 0     ferrous sulfate (IRON) 325 (65 FE) MG tablet Take 1 tablet (325 mg) by mouth daily (with breakfast) 90 tablet 0     ipratropium - albuterol 0.5 mg/2.5 mg/3 mL (DUONEB) 0.5-2.5 (3) MG/3ML neb solution Take 1 vial (3 mLs) by nebulization every 6 hours as needed for shortness of breath / dyspnea or wheezing 1 Box 0     order for DME nebulizer 1 Device 0       Allergies   Allergen Reactions     Morphine      could trigger asthma attacks as she uses inhalers     Penicillins Hives        Social History     Tobacco Use     Smoking status: Never Smoker     Smokeless tobacco: Never Used   Substance Use Topics     Alcohol use: Yes     Alcohol/week: 0.0 standard drinks     Comment: less that once per month - very rare     Family History   Problem Relation Age of Onset     Bronchitis Father      History   Drug Use No         Objective     BP (!) 158/100 (BP Location: Right arm, Cuff Size: Adult Large)   Pulse 60   Temp 99.5  F (37.5  C)   Ht 1.638 m (5' 4.5\")   Wt 107 kg (236 lb)   LMP 04/05/2016   SpO2 96%   BMI 39.88 " kg/m      Physical Exam    GENERAL APPEARANCE: healthy, alert and no distress     EYES: EOMI, PERRL     NECK: no adenopathy, no asymmetry, masses, or scars and thyroid normal to palpation     RESP: lungs clear to auscultation - no rales, rhonchi or wheezes     CV: regular rates and rhythm, normal S1 S2, no S3 or S4 and no murmur, click or rub     MS: extremities normal- no gross deformities noted, no evidence of inflammation in joints, FROM in all extremities.     SKIN: Lump in right axilla     NEURO: Normal strength and tone, sensory exam grossly normal, mentation intact and speech normal     PSYCH: mentation appears normal. and affect normal/bright     LYMPHATICS: No cervical adenopathy    Recent Labs   Lab Test 11/21/21  1654 05/07/20  1703   HGB 13.4 14.1    229   NA  --  139   POTASSIUM  --  3.4   CR  --  0.62        Diagnostics:  Labs pending at this time.  Results will be reviewed when available.   EKG: sinus bradycardia, nonspecific ST-T changes    Revised Cardiac Risk Index (RCRI):  The patient has the following serious cardiovascular risks for perioperative complications:   - No serious cardiac risks = 0 points     RCRI Interpretation: 0 points: Class I (very low risk - 0.4% complication rate)           Signed Electronically by: James Boateng MD  Copy of this evaluation report is provided to requesting physician.

## 2021-11-29 NOTE — H&P (VIEW-ONLY)
[address] [address]  Phone: [phone]  Primary Provider: Alina Mahmood  Pre-op Performing Provider: YANELI CARTY      PREOPERATIVE EVALUATION:  Today's date: 11/29/2021    Liss Berg is a 56 year old female who presents for a preoperative evaluation.    Surgical Information:  Surgery/Procedure: Removal of lump in arm pit  Surgery Location: [facility]  Surgeon:   Surgery Date: 12/01/2021  Time of Surgery: 350 PM  Where patient plans to recover: At home with family  Fax number for surgical facility: Note does not need to be faxed, will be available electronically in Epic.    Type of Anesthesia Anticipated: General    Assessment & Plan     The proposed surgical procedure is considered INTERMEDIATE risk.    Preop general physical exam  She has history of hypertension  This seems to be poorly controlled because she does not have a PCP and was having trouble obtaining her medications  She is on losartan 50 mg and metoprolol 100 mg twice a day  I will increase the losartan to 100 mg  I gave her a blood pressure monitor prescription  She needs to monitor her blood pressure at home  Hopefully the blood pressure will be better controlled in the next 48 hours  As long as the diastolic is below 100 and systolic is below 160 we can go ahead with the surgery  She did have an EKG done today which showed sinus bradycardia and nonspecific ST segment changes  There is some ST segment flattening which could be from strain pattern caused by uncontrolled hypertension  She however has no history of any CAD or CVA  She has a history of hypertrophic cardiomyopathy on the chart however she had a MRI of her heart done in 2016 which showed no evidence of any hypertrophic or restrictive cardiomyopathy  The echo done earlier that year suggested that she could be having this however MRI did shoe the same  She was followed with  cardiology  Her exercise tolerance is very good at least 6 METS or more  - Basic metabolic panel  (Ca, Cl, CO2, Creat, Gluc, K, Na, BUN); Future  - EKG 12-lead complete w/read - Clinics  - Basic metabolic panel  (Ca, Cl, CO2, Creat, Gluc, K, Na, BUN)    Axillary lump, left  She is going to have the surgery on 1 December    Benign essential hypertension  As above she has a longstanding history of uncontrolled blood pressure  When I rechecked the blood pressure in the clinic today it was 158 systolic to 100 diastolic  Initially it was 174 systolic and 98 diastolic  Heart medication intake has been erratic recently because she does not have a PCP  Today refilled medications  Increase the losartan 200 mg  She needs to recheck her blood pressure regularly  We can proceed with surgery as long as the systolic is below 160 and diastolic is below 100  - losartan (COZAAR) 100 MG tablet; Take 1 tablet (100 mg) by mouth daily  - metoprolol tartrate (LOPRESSOR) 100 MG tablet; Take 1 tablet (100 mg) by mouth 2 times daily  - Basic metabolic panel  (Ca, Cl, CO2, Creat, Gluc, K, Na, BUN); Future  - Home Blood Pressure Monitor Order for DME - ONLY FOR DME  - Basic metabolic panel  (Ca, Cl, CO2, Creat, Gluc, K, Na, BUN)         Risks and Recommendations:  The patient has the following additional risks and recommendations for perioperative complications:   -Uncontrolled blood pressure  Medications have been adjusted today  Please see note above    Medication Instructions:  Patient is to take all scheduled medications on the day of surgery    RECOMMENDATION:  APPROVAL GIVEN to proceed with proposed procedure, without further diagnostic evaluation as long as we have reasonable control of the blood pressure as stated above              30 minutes spent on the date of the encounter doing chart review, history and exam, documentation and further activities per the note        Subjective     HPI related to upcoming procedure: Lump in her  left axilla which was getting painful  She is going to have surgery on December 1    Preop Questions 11/29/2021   1. Have you ever had a heart attack or stroke? No   2. Have you ever had surgery on your heart or blood vessels, such as a stent placement, a coronary artery bypass, or surgery on an artery in your head, neck, heart, or legs? No   3. Do you have chest pain with activity? No   4. Do you have a history of  heart failure? No   5. Do you currently have a cold, bronchitis or symptoms of other infection? No   6. Do you have a cough, shortness of breath, or wheezing? No   7. Do you or anyone in your family have previous history of blood clots? No   8. Do you or does anyone in your family have a serious bleeding problem such as prolonged bleeding following surgeries or cuts? No   9. Have you ever had problems with anemia or been told to take iron pills? No   10. Have you had any abnormal blood loss such as black, tarry or bloody stools, or abnormal vaginal bleeding? No   11. Have you ever had a blood transfusion? YES -    11a. Have you ever had a transfusion reaction? No   12. Are you willing to have a blood transfusion if it is medically needed before, during, or after your surgery? Yes   13. Have you or any of your relatives ever had problems with anesthesia? No   14. Do you have sleep apnea, excessive snoring or daytime drowsiness? No   15. Do you have any artifical heart valves or other implanted medical devices like a pacemaker, defibrillator, or continuous glucose monitor? No   16. Do you have artificial joints? No   17. Are you allergic to latex? No   18. Is there any chance that you may be pregnant? No       Health Care Directive:  Patient does not have a Health Care Directive or Living Will: Discussed advance care planning with patient; however, patient declined at this time.    Status of Chronic Conditions:  See problem list for active medical problems.  Problems all longstanding and stable, except as  noted/documented.  See ROS for pertinent symptoms related to these conditions.      Review of Systems  CONSTITUTIONAL: NEGATIVE for fever, chills, change in weight  INTEGUMENTARY/SKIN: NEGATIVE for worrisome rashes, moles or lesions  EYES: NEGATIVE for vision changes or irritation  ENT/MOUTH: NEGATIVE for ear, mouth and throat problems  RESP: NEGATIVE for significant cough or SOB  CV: NEGATIVE for chest pain, palpitations or peripheral edema  GI: NEGATIVE for nausea, abdominal pain, heartburn, or change in bowel habits  : NEGATIVE for frequency, dysuria, or hematuria  MUSCULOSKELETAL: NEGATIVE for significant arthralgias or myalgia  NEURO: NEGATIVE for weakness, dizziness or paresthesias  ENDOCRINE: NEGATIVE for temperature intolerance, skin/hair changes  HEME: NEGATIVE for bleeding problems  PSYCHIATRIC: NEGATIVE for changes in mood or affect    Patient Active Problem List    Diagnosis Date Noted     Morbid obesity (H) 07/03/2018     Priority: Medium     Hypertrophic cardiomyopathy (H) 07/03/2018     Priority: Medium     Left shoulder pain 06/22/2018     Priority: Medium     Chronic left shoulder pain 05/24/2018     Priority: Medium     Mild intermittent asthma without complication 09/20/2016     Priority: Medium     Benign essential hypertension 05/25/2016     Priority: Medium     Colorectal carcinoma (H) 05/24/2016     Priority: Medium     Abdominal pain 05/10/2016     Priority: Medium     Cellulitis of leg 08/18/2013     Priority: Medium     Cellulitis 08/18/2013     Priority: Medium      Past Medical History:   Diagnosis Date     Asthma      Heart murmur      Hypertension      Past Surgical History:   Procedure Laterality Date     COLECTOMY RIGHT Right 5/11/2016    Procedure: COLECTOMY RIGHT;  Surgeon: Miguel Angel Yost MD;  Location:  OR     INSERT PORT VASCULAR ACCESS Left 7/5/2016    Procedure: INSERT PORT VASCULAR ACCESS;  Surgeon: Juan Carlos Lopez MD;  Location:  OR     LAPAROSCOPIC ASSISTED  "COLECTOMY Right 5/11/2016    Procedure: LAPAROSCOPIC ASSISTED COLECTOMY;  Surgeon: Miguel Angel Yost MD;  Location: [facility] OR     REMOVE PORT VASCULAR ACCESS N/A 7/5/2018    Procedure: REMOVE PORT VASCULAR ACCESS;  Port-a-cath removal;  Surgeon: Miguel Angel Yost MD;  Location: [facility] OR     Current Outpatient Medications   Medication Sig Dispense Refill     losartan (COZAAR) 100 MG tablet Take 1 tablet (100 mg) by mouth daily 90 tablet 1     metoprolol tartrate (LOPRESSOR) 100 MG tablet Take 1 tablet (100 mg) by mouth 2 times daily 180 tablet 1     albuterol (PROAIR HFA, PROVENTIL HFA, VENTOLIN HFA) 108 (90 BASE) MCG/ACT inhaler Inhale 2 puffs into the lungs every 6 hours as needed for shortness of breath / dyspnea or wheezing 1 Inhaler 1     albuterol (PROVENTIL) (2.5 MG/3ML) 0.083% neb solution Take 1 vial (2.5 mg) by nebulization every 6 hours as needed for shortness of breath / dyspnea or wheezing 1 Box 0     ferrous sulfate (IRON) 325 (65 FE) MG tablet Take 1 tablet (325 mg) by mouth daily (with breakfast) 90 tablet 0     ipratropium - albuterol 0.5 mg/2.5 mg/3 mL (DUONEB) 0.5-2.5 (3) MG/3ML neb solution Take 1 vial (3 mLs) by nebulization every 6 hours as needed for shortness of breath / dyspnea or wheezing 1 Box 0     order for DME nebulizer 1 Device 0       Allergies   Allergen Reactions     Morphine      could trigger asthma attacks as she uses inhalers     Penicillins Hives        Social History     Tobacco Use     Smoking status: Never Smoker     Smokeless tobacco: Never Used   Substance Use Topics     Alcohol use: Yes     Alcohol/week: 0.0 standard drinks     Comment: less that once per month - very rare     Family History   Problem Relation Age of Onset     Bronchitis Father      History   Drug Use No         Objective     BP (!) 158/100 (BP Location: Right arm, Cuff Size: Adult Large)   Pulse 60   Temp 99.5  F (37.5  C)   Ht 1.638 m (5' 4.5\")   Wt 107 kg (236 lb)   LMP 04/05/2016   SpO2 96%   BMI 39.88 " kg/m      Physical Exam    GENERAL APPEARANCE: healthy, alert and no distress     EYES: EOMI, PERRL     NECK: no adenopathy, no asymmetry, masses, or scars and thyroid normal to palpation     RESP: lungs clear to auscultation - no rales, rhonchi or wheezes     CV: regular rates and rhythm, normal S1 S2, no S3 or S4 and no murmur, click or rub     MS: extremities normal- no gross deformities noted, no evidence of inflammation in joints, FROM in all extremities.     SKIN: Lump in right axilla     NEURO: Normal strength and tone, sensory exam grossly normal, mentation intact and speech normal     PSYCH: mentation appears normal. and affect normal/bright     LYMPHATICS: No cervical adenopathy    Recent Labs   Lab Test 11/21/21  1654 05/07/20  1703   HGB 13.4 14.1    229   NA  --  139   POTASSIUM  --  3.4   CR  --  0.62        Diagnostics:  Labs pending at this time.  Results will be reviewed when available.   EKG: sinus bradycardia, nonspecific ST-T changes    Revised Cardiac Risk Index (RCRI):  The patient has the following serious cardiovascular risks for perioperative complications:   - No serious cardiac risks = 0 points     RCRI Interpretation: 0 points: Class I (very low risk - 0.4% complication rate)           Signed Electronically by: James Boateng MD  Copy of this evaluation report is provided to requesting physician.

## 2021-11-30 ASSESSMENT — ASTHMA QUESTIONNAIRES: ACT_TOTALSCORE: 22

## 2021-12-01 ENCOUNTER — HOSPITAL ENCOUNTER (OUTPATIENT)
Facility: CLINIC | Age: 56
Discharge: HOME | End: 2021-12-01
Attending: SURGERY | Admitting: SURGERY
Payer: COMMERCIAL

## 2021-12-01 ENCOUNTER — ANESTHESIA (OUTPATIENT)
Dept: SURGERY | Facility: CLINIC | Age: 56
End: 2021-12-01
Payer: COMMERCIAL

## 2021-12-01 ENCOUNTER — APPOINTMENT (OUTPATIENT)
Dept: SURGERY | Facility: PHYSICIAN GROUP | Age: 56
End: 2021-12-01
Payer: COMMERCIAL

## 2021-12-01 ENCOUNTER — ANESTHESIA EVENT (OUTPATIENT)
Dept: SURGERY | Facility: CLINIC | Age: 56
End: 2021-12-01
Payer: COMMERCIAL

## 2021-12-01 VITALS
TEMPERATURE: 98.8 F | DIASTOLIC BLOOD PRESSURE: 85 MMHG | BODY MASS INDEX: 40.22 KG/M2 | OXYGEN SATURATION: 100 % | SYSTOLIC BLOOD PRESSURE: 184 MMHG | HEIGHT: 64 IN | HEART RATE: 54 BPM | WEIGHT: 235.6 LBS | RESPIRATION RATE: 16 BRPM

## 2021-12-01 DIAGNOSIS — L02.412 ABSCESS OF LEFT AXILLA: ICD-10-CM

## 2021-12-01 PROCEDURE — 11450 EXC SKN HDRDNT AX SMPL/NTRM: CPT | Performed by: SURGERY

## 2021-12-01 PROCEDURE — 250N000009 HC RX 250: Performed by: SURGERY

## 2021-12-01 PROCEDURE — 250N000011 HC RX IP 250 OP 636: Performed by: NURSE ANESTHETIST, CERTIFIED REGISTERED

## 2021-12-01 PROCEDURE — 370N000017 HC ANESTHESIA TECHNICAL FEE, PER MIN: Performed by: SURGERY

## 2021-12-01 PROCEDURE — 710N000012 HC RECOVERY PHASE 2, PER MINUTE: Performed by: SURGERY

## 2021-12-01 PROCEDURE — 250N000009 HC RX 250: Performed by: NURSE ANESTHETIST, CERTIFIED REGISTERED

## 2021-12-01 PROCEDURE — 258N000003 HC RX IP 258 OP 636: Performed by: NURSE ANESTHETIST, CERTIFIED REGISTERED

## 2021-12-01 PROCEDURE — 360N000075 HC SURGERY LEVEL 2, PER MIN: Performed by: SURGERY

## 2021-12-01 PROCEDURE — 272N000001 HC OR GENERAL SUPPLY STERILE: Performed by: SURGERY

## 2021-12-01 PROCEDURE — 88342 IMHCHEM/IMCYTCHM 1ST ANTB: CPT | Mod: TC | Performed by: SURGERY

## 2021-12-01 PROCEDURE — 999N000141 HC STATISTIC PRE-PROCEDURE NURSING ASSESSMENT: Performed by: SURGERY

## 2021-12-01 RX ORDER — LIDOCAINE HYDROCHLORIDE 10 MG/ML
INJECTION, SOLUTION INFILTRATION; PERINEURAL PRN
Status: DISCONTINUED | OUTPATIENT
Start: 2021-12-01 | End: 2021-12-01

## 2021-12-01 RX ORDER — LIDOCAINE 40 MG/G
CREAM TOPICAL
Status: DISCONTINUED | OUTPATIENT
Start: 2021-12-01 | End: 2021-12-01 | Stop reason: HOSPADM

## 2021-12-01 RX ORDER — FENTANYL CITRATE 50 UG/ML
25 INJECTION, SOLUTION INTRAMUSCULAR; INTRAVENOUS
Status: DISCONTINUED | OUTPATIENT
Start: 2021-12-01 | End: 2021-12-01 | Stop reason: HOSPADM

## 2021-12-01 RX ORDER — SODIUM CHLORIDE, SODIUM LACTATE, POTASSIUM CHLORIDE, CALCIUM CHLORIDE 600; 310; 30; 20 MG/100ML; MG/100ML; MG/100ML; MG/100ML
INJECTION, SOLUTION INTRAVENOUS CONTINUOUS PRN
Status: DISCONTINUED | OUTPATIENT
Start: 2021-12-01 | End: 2021-12-01

## 2021-12-01 RX ORDER — MEPERIDINE HYDROCHLORIDE 25 MG/ML
12.5 INJECTION INTRAMUSCULAR; INTRAVENOUS; SUBCUTANEOUS
Status: DISCONTINUED | OUTPATIENT
Start: 2021-12-01 | End: 2021-12-01 | Stop reason: HOSPADM

## 2021-12-01 RX ORDER — HYDROCODONE BITARTRATE AND ACETAMINOPHEN 5; 325 MG/1; MG/1
1-2 TABLET ORAL EVERY 4 HOURS PRN
Qty: 10 TABLET | Refills: 0 | Status: SHIPPED | OUTPATIENT
Start: 2021-12-01

## 2021-12-01 RX ORDER — PROPOFOL 10 MG/ML
INJECTION, EMULSION INTRAVENOUS PRN
Status: DISCONTINUED | OUTPATIENT
Start: 2021-12-01 | End: 2021-12-01

## 2021-12-01 RX ORDER — KETAMINE HYDROCHLORIDE 10 MG/ML
INJECTION INTRAMUSCULAR; INTRAVENOUS PRN
Status: DISCONTINUED | OUTPATIENT
Start: 2021-12-01 | End: 2021-12-01

## 2021-12-01 RX ORDER — GLYCOPYRROLATE 0.2 MG/ML
INJECTION, SOLUTION INTRAMUSCULAR; INTRAVENOUS PRN
Status: DISCONTINUED | OUTPATIENT
Start: 2021-12-01 | End: 2021-12-01

## 2021-12-01 RX ORDER — HYDRALAZINE HYDROCHLORIDE 20 MG/ML
INJECTION INTRAMUSCULAR; INTRAVENOUS PRN
Status: DISCONTINUED | OUTPATIENT
Start: 2021-12-01 | End: 2021-12-01

## 2021-12-01 RX ORDER — ONDANSETRON 2 MG/ML
4 INJECTION INTRAMUSCULAR; INTRAVENOUS EVERY 30 MIN PRN
Status: DISCONTINUED | OUTPATIENT
Start: 2021-12-01 | End: 2021-12-01 | Stop reason: HOSPADM

## 2021-12-01 RX ORDER — OXYCODONE HYDROCHLORIDE 5 MG/1
5 TABLET ORAL EVERY 4 HOURS PRN
Status: DISCONTINUED | OUTPATIENT
Start: 2021-12-01 | End: 2021-12-01 | Stop reason: HOSPADM

## 2021-12-01 RX ORDER — PROPOFOL 10 MG/ML
INJECTION, EMULSION INTRAVENOUS CONTINUOUS PRN
Status: DISCONTINUED | OUTPATIENT
Start: 2021-12-01 | End: 2021-12-01

## 2021-12-01 RX ORDER — SODIUM CHLORIDE, SODIUM LACTATE, POTASSIUM CHLORIDE, CALCIUM CHLORIDE 600; 310; 30; 20 MG/100ML; MG/100ML; MG/100ML; MG/100ML
INJECTION, SOLUTION INTRAVENOUS CONTINUOUS
Status: DISCONTINUED | OUTPATIENT
Start: 2021-12-01 | End: 2021-12-01 | Stop reason: HOSPADM

## 2021-12-01 RX ORDER — HYDROMORPHONE HCL IN WATER/PF 6 MG/30 ML
0.2 PATIENT CONTROLLED ANALGESIA SYRINGE INTRAVENOUS EVERY 5 MIN PRN
Status: CANCELLED | OUTPATIENT
Start: 2021-12-01

## 2021-12-01 RX ORDER — FENTANYL CITRATE 50 UG/ML
25 INJECTION, SOLUTION INTRAMUSCULAR; INTRAVENOUS EVERY 5 MIN PRN
Status: CANCELLED | OUTPATIENT
Start: 2021-12-01

## 2021-12-01 RX ORDER — HYDROCODONE BITARTRATE AND ACETAMINOPHEN 5; 325 MG/1; MG/1
1 TABLET ORAL
Status: DISCONTINUED | OUTPATIENT
Start: 2021-12-01 | End: 2021-12-01 | Stop reason: HOSPADM

## 2021-12-01 RX ORDER — ONDANSETRON 4 MG/1
4 TABLET, ORALLY DISINTEGRATING ORAL EVERY 30 MIN PRN
Status: DISCONTINUED | OUTPATIENT
Start: 2021-12-01 | End: 2021-12-01 | Stop reason: HOSPADM

## 2021-12-01 RX ADMIN — PROPOFOL 80 MCG/KG/MIN: 10 INJECTION, EMULSION INTRAVENOUS at 15:01

## 2021-12-01 RX ADMIN — Medication 30 MG: at 15:01

## 2021-12-01 RX ADMIN — PROPOFOL 30 MG: 10 INJECTION, EMULSION INTRAVENOUS at 15:14

## 2021-12-01 RX ADMIN — HYDRALAZINE HYDROCHLORIDE 4 MG: 20 INJECTION INTRAMUSCULAR; INTRAVENOUS at 15:09

## 2021-12-01 RX ADMIN — SODIUM CHLORIDE, POTASSIUM CHLORIDE, SODIUM LACTATE AND CALCIUM CHLORIDE: 600; 310; 30; 20 INJECTION, SOLUTION INTRAVENOUS at 14:29

## 2021-12-01 RX ADMIN — LIDOCAINE HYDROCHLORIDE 50 MG: 10 INJECTION, SOLUTION INFILTRATION; PERINEURAL at 15:00

## 2021-12-01 RX ADMIN — MIDAZOLAM 2 MG: 1 INJECTION INTRAMUSCULAR; INTRAVENOUS at 14:57

## 2021-12-01 RX ADMIN — GLYCOPYRROLATE 0.1 MG: 0.2 INJECTION, SOLUTION INTRAMUSCULAR; INTRAVENOUS at 15:08

## 2021-12-01 RX ADMIN — Medication 10 MG: at 15:13

## 2021-12-01 RX ADMIN — PROPOFOL 30 MG: 10 INJECTION, EMULSION INTRAVENOUS at 15:01

## 2021-12-01 ASSESSMENT — ENCOUNTER SYMPTOMS: DYSRHYTHMIAS: 0

## 2021-12-01 ASSESSMENT — MIFFLIN-ST. JEOR: SCORE: 1643.67

## 2021-12-01 NOTE — DISCHARGE INSTRUCTIONS
HOME CARE FOLLOWING MINOR SURGERY  RODNEY Lee, ADA Holbrook R. O Donnell, J. Shaheen    RESULTS:  If a biopsy of tissue was done, you may call for your final pathology report after 1p.m. two working days after surgery.  Otherwise, this will be reviewed with you at time of phone follow-up (described below).    INCISIONAL CARE:    If you have a dressing in place, keep clean and dry for 48 hours; you may replace the gauze if it becomes soiled.    After 48 hours you may remove the dressing and shower.  Do not submerse incision in water for 1 week.    If you have a Dermabond dressing (a type of skin glue), you may shower immediately.    Sutures which are beneath the skin will absorb and do not need to be removed.    Sutures you can see should be removed at your surgeon's office near 2 weeks postop, unless otherwise instructed.    If present, leave the steri-strips (white paper tapes) in place for 14 days after surgery.    If present, leave Dermabond glue in place until it wears/flakes off.    You may expect a small amount of drainage from your incision.    A lump/ridge under the incision is normal and will gradually resolve.  If it becomes red or very uncomfortable, contact the nurse at your surgeon's office to discuss whether this needs to be evaluated.    ACTIVITY:  Cautiously resume exercise and strenuous activities such as jogging, tennis, aerobics, etc. Also, be careful of stretching activities which affect the area of surgery for two weeks.    DIET:  Start with liquids and gradually resume your regular diet as tolerated.  Increased fluid intake is recommended. While taking pain medications, consider use of a stool softener, increase your fiber in your diet, or add a fiber supplement (like Metamucil, Citrucel) to help prevent constipation - a possible side effect of pain medications.    DISCOMFORT:  Local anesthetic placed at surgery should provide relief for 4-8 hours.  Begin taking pain  pills before discomfort is severe.  Take the pain medication with some food, when possible, to minimize side effects.  Intermittent use of ice packs may help during the first 1-3 weeks after surgery.  Expect gradual improvement.    Over-the-counter anti-inflammatory medications (i.e. Ibuprofen/Advil/Motrin or Naprosyn/Aleve) may be used per package instructions in addition to or while tapering off the narcotic pain medications to decrease swelling and sensitivity.  DO NOT TAKE these Anti-inflammatory medications if your primary physician has advised against doing so, or if you have acid reflux, ulcer, or bleeding disorder, or take blood-thinner medications.  Call your primary physician or the surgery office if you have medication questions.      FOLLOW-UP AFTER SURGERY:  -Our office will contact you approximately 2-3 weeks after surgery to check on your progress and answer any questions you may have.  If you are doing well, you will not need to return for an office appointment.  If any concerns are identified over the phone, we will help you make an appointment to see a provider.    -If you have not received a phone call, have any questions or concerns, or would like to be seen, please call us at [phone].  We are located at: [address], [address]; [place] [address]    -CONTACT US IF THE FOLLOWING DEVELOPS:   1. A fever that is above 101     2. Increased redness, warmth, drainage, bleeding, or swelling.   3. Pain that is not relieved by rest/ice and your prescription.   4.  Increasing pain after 48 hours.   5. Drainage that is thick, cloudy, yellow, green or white.   6. Any other questions or concerns.      FREQUENTLY ASKED QUESTIONS:    Q:  How should my incision look?    A:  Normally your incision will appear slightly swollen with light redness directly along the incision itself as it heals.  It may feel like a bump or ridge as the healing/scarring happens, and over time (3-4 months) this bump or  ridge feeling should slowly go away.  In general, clear or pink watery drainage can be normal at first as your incision heals, but should decrease over time.    Q:  How do I know if my incision is infected?  A:  Look at your incision for signs of infection, like redness around the incision spreading to surrounding skin, or drainage of cloudy or foul-smelling drainage.  If you feel warm, check your temperature to see if you are running a fever.    **If any of these things occur, please notify the nurse at our office.  We may need you to come into the office for an incision check.      Q:  How do I take care of my incision?  A:  If you have a dressing in place - Starting the day after surgery, replace the dressing 1-2 times a day until there is no further drainage from the incision.  At that time, a dressing is no longer needed.  Try to minimize tape on the skin if irritation is occurring at the tape sites.  If you have significant irritation from tape on the skin, please call the office to discuss other method of dressing your incision.    Small pieces of tape called  steri-strips  may be present directly overlying your incision; these may be removed 10 days after surgery unless otherwise specified by your surgeon.  If these tapes start to loosen at the ends, you may trim them back until they fall off or are removed.    A:  If you had  Dermabond  tissue glue used as a dressing (this causes your incision to look shiny with a clear covering over it) - This type of dressing wears off with time and does not require more dressings over the top unless it is draining around the glue as it wears off.  Do not apply ointments or lotions over the incisions until the glue has completely worn off.    Q:  There is a piece of tape or a sticky  lead  still on my skin.  Can I remove this?  A:  Sometimes the sticky  leads  used for monitoring during surgery or for evaluation in the emergency department are not all removed while you  are in the hospital.  These sometimes have a tab or metal dot on them.  You can easily remove these on your own, like taking off a band-aid.  If there is a gel substance under the  lead , simply wipe/clean it off with a washcloth or paper towel.      Q:  What can I do to minimize constipation (very hard stools, or lack of stools)?  A:  Stay well hydrated.  Increase your dietary fiber intake or take a fiber supplement -with plenty of water.  Walk around frequently.  You may consider an over-the-counter stool-softener.  Your Pharmacist can assist you with choosing one that is stocked at your pharmacy.  Constipation is also one of the most common side effects of pain medication.  If you are using pain medication, be pro-active and try to PREVENT problems with constipation by taking the steps above BEFORE constipation becomes a problem.    Q:  What do I do if I need more pain medications?  A:  Call the office to receive refills.  Be aware that certain pain meds cannot be called into a pharmacy and actually require a paper prescription.  A change may be made in your pain med as you progress thru your recovery period or if you have side effects to certain meds.    --Pain meds are NOT refilled after 5pm on weekdays, and NOT AT ALL on the weekends, so please look ahead to prevent problems.      Q:  Why am I having a hard time sleeping now that I am at home?  A:  Many medications you receive while you are in the hospital can impact your sleep for a number of days after your surgery/hospitalization.  Decreased level of activity and naps during the day may also make sleeping at night difficult.  Try to minimize day-time naps, and get up frequently during the day to walk around your home during your recovery time.  Sleep aides may be of some help, but are not recommended for long-term use.      Q:  I am having some back discomfort.  What should I do?  A:  This may be related to certain positioning that was required for your  surgery, extended periods of time in bed, or other changes in your overall activity level.  You may try ice, heat, acetaminophen, or ibuprofen to treat this temporarily.  Note that many pain medications have acetaminophen in them and would state this on the prescription bottle.  Be sure not to exceed the maximum of 4000mg per day of acetaminophen.     **If the pain you are having does not resolve, is severe, or is a flare of back pain you have had on other occasions prior to surgery, please contact your primary physician for further recommendations or for an appointment to be examined at their office.    Q:  Why am I having headaches?  A:  Headaches can be caused by many things:  caffeine withdrawal, use of pain meds, dehydration, high blood pressure, lack of sleep, over-activity/exhaustion, flare-up of usual migraine headaches.  If you feel this is related to muscle tension (a band-like feeling around the head, or a pressure at the low-back of the head) you may try ice or heat to this area.  You may need to drink more fluids (try electrolyte drink like Gatorade), rest, or take your usual migraine medications.   **If your headaches do not resolve, worsen, are accompanied by other symptoms, or if your blood pressure is high, please call your primary physician for recommendation and/or examination.    Q:  I am unable to urinate.  What do I do?  A:  A small percentage of people can have difficulty urinating initially after surgery.  This includes being able to urinate only a very small amount at a time and feeling discomfort or pressure in the very low abdomen.  This is called  urinary retention , and is actually an urgent situation.  Proceed to your nearest Emergency department for evaluation (not an Urgent Care Center).  Sometimes the bladder does not work correctly after certain medications you receive during surgery, or related to certain procedures.  You may need to have a catheter placed until your bladder  recovers.  When planning to go to an Emergency department, it may help to call the ER to let them know you are coming in for this problem after a surgery.  This may help you get in quicker to be evaluated.  **If you have symptoms of a urinary tract infection, please contact your primary physician for the proper evaluation and treatment.        If you have other questions, please call the office Monday thru Friday between 8am and 5pm to discuss with the nurse or physician assistant.  [phone]    There is a surgeon ON CALL on weekday evenings and over the weekend in case of urgent need only, and may be contacted at the same number.    If you are having an emergency, call 911 or proceed to your nearest emergency department.

## 2021-12-01 NOTE — ANESTHESIA CARE TRANSFER NOTE
Patient: Liss Berg    Procedure: Procedure(s):  INCISION AND DRAINAGE, TORSO, LEFT AXILLA       Diagnosis: Abscess of left axilla [L02.412]  Diagnosis Additional Information: No value filed.    Anesthesia Type:   MAC     Note:    Oropharynx: oropharynx clear of all foreign objects  Level of Consciousness: awake  Oxygen Supplementation: room air    Independent Airway: airway patency satisfactory and stable  Dentition: dentition unchanged  Vital Signs Stable: post-procedure vital signs reviewed and stable  Report to RN Given: handoff report given  Patient transferred to: Phase II    Handoff Report: Identifed the Patient, Identified the Reponsible Provider, Reviewed the pertinent medical history, Discussed the surgical course, Reviewed Intra-OP anesthesia mangement and issues during anesthesia, Set expectations for post-procedure period and Allowed opportunity for questions and acknowledgement of understanding      Vitals:  Vitals Value Taken Time   BP     Temp     Pulse     Resp     SpO2         Electronically Signed By: MUSHTAQ Figueroa CRNA  December 1, 2021  3:36 PM

## 2021-12-01 NOTE — OR NURSING
Spoke with anesthesia about elevated SBP.  Patient had met criteria for discharge prior and IV was removed.  MDA advised that patient take her evening doses of blood pressure medications at home. OK to discharge.

## 2021-12-01 NOTE — BRIEF OP NOTE
[facility]    Brief Operative Note    Pre-operative diagnosis: Abscess of left axilla [L02.412]  Post-operative diagnosis Left axillary abscess    Procedure: Procedure(s):  INCISION AND DRAINAGE, TORSO, LEFT AXILLA  Surgeon: Surgeon(s) and Role:     * New Hinds MD - Primary  Anesthesia: Monitor Anesthesia Care   Estimated Blood Loss: Minimal    Drains: None  Specimens:   ID Type Source Tests Collected by Time Destination   1 : Left axillary mass Tissue Axilla, Left SURGICAL PATHOLOGY EXAM New Hinds MD 12/1/2021  3:20 PM      Findings:   Inflammed small cavity with adjacent fat necrosis, possible hidradenitis; excised widely and closed primarily..  Complications: None.  Implants: * No implants in log *

## 2021-12-01 NOTE — ANESTHESIA POSTPROCEDURE EVALUATION
Patient: Liss Berg    Procedure: Procedure(s):  INCISION AND DRAINAGE, TORSO, LEFT AXILLA       Diagnosis:Abscess of left axilla [L02.412]  Diagnosis Additional Information: No value filed.    Anesthesia Type:  MAC    Note:  Disposition: Outpatient   Postop Pain Control: Uneventful            Sign Out: Well controlled pain   PONV: No   Neuro/Psych: Uneventful            Sign Out: Acceptable/Baseline neuro status   Airway/Respiratory: Uneventful            Sign Out: Acceptable/Baseline resp. status   CV/Hemodynamics: Uneventful            Sign Out: Acceptable CV status; No obvious hypovolemia; No obvious fluid overload   Other NRE: NONE   DID A NON-ROUTINE EVENT OCCUR? No           Last vitals:  Vitals Value Taken Time   /85 12/01/21 1600   Temp 98.8  F (37.1  C) 12/01/21 1600   Pulse 54 12/01/21 1600   Resp 16 12/01/21 1600   SpO2 100 % 12/01/21 1532       Electronically Signed By: Adarsh Mota MD  December 1, 2021  4:44 PM

## 2021-12-01 NOTE — ANESTHESIA PREPROCEDURE EVALUATION
Anesthesia Pre-Procedure Evaluation    Patient: Liss Berg   MRN: [id number] : 1965        Preoperative Diagnosis: Abscess of left axilla [L02.412]    Procedure : Procedure(s):  INCISION AND DRAINAGE, TORSO, LEFT AXILLA          Past Medical History:   Diagnosis Date     Asthma      Heart murmur      Hypertension      PONV (postoperative nausea and vomiting)       Past Surgical History:   Procedure Laterality Date     APPENDECTOMY       COLECTOMY RIGHT Right 2016    Procedure: COLECTOMY RIGHT;  Surgeon: Miguel Angel Yost MD;  Location: [facility] OR     INSERT PORT VASCULAR ACCESS Left 2016    Procedure: INSERT PORT VASCULAR ACCESS;  Surgeon: Juan Carlos Lopez MD;  Location: [facility]     LAPAROSCOPIC ASSISTED COLECTOMY Right 2016    Procedure: LAPAROSCOPIC ASSISTED COLECTOMY;  Surgeon: Miguel Angel Yost MD;  Location: [facility]     ORTHOPEDIC SURGERY       REMOVE PORT VASCULAR ACCESS N/A 2018    Procedure: REMOVE PORT VASCULAR ACCESS;  Port-a-cath removal;  Surgeon: Miguel Angel Yost MD;  Location: [facility]      Allergies   Allergen Reactions     Morphine      could trigger asthma attacks as she uses inhalers     Penicillins Hives      Social History     Tobacco Use     Smoking status: Never Smoker     Smokeless tobacco: Never Used   Substance Use Topics     Alcohol use: Not Currently     Alcohol/week: 0.0 standard drinks     Comment: less that once per month - very rare      Wt Readings from Last 1 Encounters:   21 106.9 kg (235 lb 9.6 oz)        Anesthesia Evaluation   Pt has had prior anesthetic. Type: General.    History of anesthetic complications  - PONV.      ROS/MED HX  ENT/Pulmonary:     (+) asthma     Neurologic:  - neg neurologic ROS     Cardiovascular: Comment: Study Date: 05/10/2016 12:50 PM  Age: 51 yrs  Gender: Female  [facility]  Reason For Study: Murmur  Ordering Physician: MARCIAL RIVERA  Referring Physician: Primary Dr. Lan  Performed By: Tessa Combs,      BSA: 2.0 m2  Height: 64 in  Weight: 219 lb  HR: 80  BP: 174/86 mmHg  ______________________________________________________________________________        Procedure  Complete Echo Adult. Contrast Optison.  ______________________________________________________________________________     Interpretation Summary     The left ventricle is normal in size.  There is moderate to severe concentric left ventricular hypertrophy.  Left ventricular systolic function is borderline reduced.  The visual ejection fraction is estimated at 50-55%.  E by E prime ratio is greater than 15, that likely suggests increased left  ventricular filling pressures.  The left atrium is moderately dilated.  Trivial pericardial effusion     The echocardiogram findings are suggestive of hypertrophic cardiomyopathy vs  infiltrative cardiomyopathy (i.e Amyloid). Consider further imaging with MRI  if clinically indicated.    Subsequent MRI neg for HCM    (+) hypertension-----valvular problems/murmurs  (-) CAD, CHF and arrhythmias   METS/Exercise Tolerance:     Hematologic:       Musculoskeletal:       GI/Hepatic:    (-) GERD and hepatitis   Renal/Genitourinary:  - neg Renal ROS     Endo:     (+) Obesity,     Psychiatric/Substance Use:    (-) psychiatric history   Infectious Disease:  - neg infectious disease ROS     Malignancy:       Other:            Physical Exam    Airway        Mallampati: II   TM distance: > 3 FB   Neck ROM: full   Mouth opening: > 3 cm    Respiratory Devices and Support         Dental           Cardiovascular   cardiovascular exam normal          Pulmonary   pulmonary exam normal            Other findings: Lab Test        11/21/21     05/07/20     03/13/19     05/10/16     05/09/16                       1654          1703          1343          0825          2150          WBC          8.8          6.7          7.1            < >        8.5           HGB          13.4         14.1         13.5           < >        8.0*           MCV          86           86           86             < >        66*           PLT          284          229          217            < >        327           INR           --           --           --           --          1.04           < > = values in this interval not displayed.                  Lab Test        11/29/21 05/07/20 03/13/19                       0829          1703          1343          NA           140          139          142           POTASSIUM    3.6          3.4          3.6           CHLORIDE     108          107          110*          CO2          28           26           23            BUN          9            9            10            CR           0.61         0.62         0.85          ANIONGAP     4            6            9             RENETTA          9.2          8.8          9.0           GLC          157*         87           103*            OUTSIDE LABS:  CBC:   Lab Results   Component Value Date    WBC 8.8 11/21/2021    WBC 6.7 05/07/2020    HGB 13.4 11/21/2021    HGB 14.1 05/07/2020    HCT 40.2 11/21/2021    HCT 41.8 05/07/2020     11/21/2021     05/07/2020     BMP:   Lab Results   Component Value Date     11/29/2021     05/07/2020    POTASSIUM 3.6 11/29/2021    POTASSIUM 3.4 05/07/2020    CHLORIDE 108 11/29/2021    CHLORIDE 107 05/07/2020    CO2 28 11/29/2021    CO2 26 05/07/2020    BUN 9 11/29/2021    BUN 9 05/07/2020    CR 0.61 11/29/2021    CR 0.62 05/07/2020     (H) 11/29/2021    GLC 87 05/07/2020     COAGS:   Lab Results   Component Value Date    PTT 29 05/09/2016    INR 1.04 05/09/2016     POC:   Lab Results   Component Value Date    HCG Negative 07/05/2016     HEPATIC:   Lab Results   Component Value Date    ALBUMIN 4.2 05/07/2020    PROTTOTAL 8.4 05/07/2020    ALT 33 05/07/2020    AST 19 05/07/2020    ALKPHOS 85 05/07/2020    BILITOTAL 1.2 05/07/2020     OTHER:   Lab Results   Component Value Date    LACT 1.2 05/09/2016    A1C 5.3  08/18/2013    RENETTA 9.2 11/29/2021    MAG 2.2 05/09/2016    LIPASE 89 05/07/2020    CRP <2.9 05/07/2020    SED 13 05/07/2020       Anesthesia Plan    ASA Status:  3      Anesthesia Type: MAC.     - Reason for MAC: chronic cardiopulmonary disease, straight local not clinically adequate   Induction: Propofol.           Consents    Anesthesia Plan(s) and associated risks, benefits, and realistic alternatives discussed. Questions answered and patient/representative(s) expressed understanding.    - Discussed:     - Discussed with:  Patient      - Extended Intubation/Ventilatory Support Discussed: No.      - Patient is DNR/DNI Status: No    Use of blood products discussed: No .     Postoperative Care    Pain management: IV analgesics, Oral pain medications.   PONV prophylaxis: Ondansetron (or other 5HT-3), Background Propofol Infusion     Comments:                Adarsh Mota MD

## 2021-12-02 NOTE — OP NOTE
Procedure Date: 12/01/2021    PREOPERATIVE DIAGNOSIS:  Left axillary abscess.    POSTOPERATIVE DIAGNOSIS:  Left axillary abscess.    PROCEDURE:  Excision of left axillary mass.    ANESTHESIA:  Local plus MAC.    SURGEON:  New Dolan MD    ASSISTANT:  Christopher Seaver, MS3.    SPECIMENS:  Left axillary mass for routine handling.    COMPLICATIONS:  None.    INDICATIONS FOR PROCEDURE:  Ms. Berg is a 56-year-old female, whom I saw in my office just before Thanksgiving, who presented with a lesion in the left axilla, which was swollen, tender, and associated with surrounding cellulitis.  She had been on antibiotics at the time of our evaluation, and the cellulitis had receded slightly, but there was still a tender and erythematous mass consistent with a possible abscess.  I offered incision and drainage in the operating room, which was scheduled for today.  At the time of evaluation in preop today, the inflammation had subsided significantly, but there was still a tender lump deep to the site of concern.  The risks of the procedure, including infection, bleeding, harm to structures, need to heal by secondary intention as well as recurrence of the underlying lesion were reviewed.  The patient verbalized understanding of the above and consented to proceed.    FINDINGS:  There was a sinus tract type lesion consistent with hidradenitis with some surrounding fat necrosis.  There was no active infection at the time of the procedure.  This was widely excised and closed primarily.    DESCRIPTION OF PROCEDURE:  With the patient under moderate sedation in the supine position, the left axilla was clippered and prepped and draped out with chlorhexidine in the usual fashion.  A timeout was performed confirming the patient and procedure to be done as well as drug allergies and site marked.  She required no antibiotic prophylaxis as she was currently on treatment for this lesion.  We began by marking an elliptical skin roma  around the site of palpation and anesthetized it with a mixture of 1% lidocaine and 0.5% Marcaine buffered.  Anesthetic effect was tested and the skin was subsequently incised sharply with a #15 blade.  Electrocautery dissection was carried out through subcutaneous fat and we widely excised both what appeared to be a sinus tract and some fat necrosis near it.  The specimen was marked as left axillary mass and submitted in formalin for routine exam.  The operative site was then irrigated, cauterized, and closed loosely with interrupted 3-0 Vicryl.  Steri-Strips and a dry sterile dressing were applied over top.  The patient tolerated the procedure well and was brought to phase 2 in excellent condition.  All sharps and sponge counts were correct at the conclusion of the case.    New Keenan MD        D: 2021   T: 2021   MT: STEPHAN    Name:     KISHA MATA  MRN:      [id number]        Account:        [id number]   :      1965           Procedure Date: 2021     Document: [id number]

## 2021-12-06 LAB
PATH REPORT.COMMENTS IMP SPEC: NORMAL
PATH REPORT.FINAL DX SPEC: NORMAL
PATH REPORT.GROSS SPEC: NORMAL
PATH REPORT.MICROSCOPIC SPEC OTHER STN: NORMAL
PATH REPORT.RELEVANT HX SPEC: NORMAL
PHOTO IMAGE: NORMAL

## 2021-12-06 PROCEDURE — 88342 IMHCHEM/IMCYTCHM 1ST ANTB: CPT | Mod: 26 | Performed by: PATHOLOGY

## 2021-12-06 PROCEDURE — 88341 IMHCHEM/IMCYTCHM EA ADD ANTB: CPT | Mod: 26 | Performed by: PATHOLOGY

## 2021-12-06 PROCEDURE — 88305 TISSUE EXAM BY PATHOLOGIST: CPT | Mod: 26 | Performed by: PATHOLOGY

## 2021-12-13 ENCOUNTER — TELEPHONE (OUTPATIENT)
Dept: SURGERY | Facility: CLINIC | Age: 56
End: 2021-12-13
Payer: COMMERCIAL

## 2021-12-13 NOTE — TELEPHONE ENCOUNTER
S/p Excision of left axillary mass  Date: 12/1/21  Surgeon: Dr. Dolan    Patient reports she has been having thin clear drainage from incision site since surgery.  She has been keeping the area covered with gauze dressing and changing 2-3 times a day.      Yesterday she had a significant increase in drainage, with thin clear drainage saturating gauze dressing and running down her arm.    Liss denies redness and fever.  States she has some swelling in the area, but not much.  Some soreness, but reports the area is not painful.      We discussed the nature of seromas and that it will likely drain until all fluid is out.      She is to return to work this week.  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    Scheduled patient for post-op appointment with clinic PA tomorrow morning for seroma check.      Patient will keep the area covered with gauze dressing and change prn until she is seen.    Liss verbalized understanding of our conversation and agrees with plan.

## 2021-12-13 NOTE — TELEPHONE ENCOUNTER
Name of caller: Patient    Reason for Call:  Drainage    Surgeon:  Dr. Dolan    Recent Surgery:  Yes.    If yes, when & what type:  12/1/21 Excision of left axillary mass.      Best phone number to reach pt at is: [phone]  Ok to leave a message with medical info? Yes.    Pharmacy preferred (if calling for a refill): na

## 2021-12-14 ENCOUNTER — OFFICE VISIT (OUTPATIENT)
Dept: SURGERY | Facility: CLINIC | Age: 56
End: 2021-12-14
Payer: COMMERCIAL

## 2021-12-14 DIAGNOSIS — Z09 FOLLOW-UP EXAMINATION FOLLOWING SURGERY: Primary | ICD-10-CM

## 2021-12-14 PROCEDURE — 99024 POSTOP FOLLOW-UP VISIT: CPT | Performed by: PHYSICIAN ASSISTANT

## 2021-12-14 NOTE — PROGRESS NOTES
12/14/2021    [facility] Note     Subjective:  Liss Berg is here for her seroma check. She underwent excision of left axillary abscess by Dr. Dolan on 12/1/21. Today she  tells me she has been feeling well since surgery. However, her incision has been draining clear fluid since surgery. She has been requiring dressing changes TID, but noticed a significant increase in drainage 2 days ago which easily saturates her gauze dressings.  Patient is concerned because she is a  at [employer] and can't change her dressing if it saturates during work. She intended to return to work today but requests 3 more days off work.    Objective:  Left axillary incision - cdi, with serous drainage leaking from center of incision.  No mass/seroma noted upon palpation so aspiration is not indicated. ABD dressing placed with minimal tape.          Assessment:  Postop seroma draining from incision  S/p excision of left axillary abscess 12/1/21. The pathology confirms skin with dermal and subcutaneous tissue involved by acute and chronic inflammation and histiocytic reaction, suggestive of treated abscess.    Plan:  Continue overlay dressings until drainage resolves.  Recommend Maxi pads for higher absorption capacity.   Work note given, now planning return to work 12/17.  RTC PRN      Fanny Jane PA-C      Please route or send letter to:  Primary Care Provider (PCP)

## 2021-12-14 NOTE — LETTER
[address], [address]  [place] [address]  [phone]  F: [phone]    [address], [address]  [place], [place] [address] [phone]  F: [phone]    [email or web address]  [email or web address]  [email or web address]       2021    RE: Liss Berg  :  1965      This is to certify that Lsis FRAGOSO Otis has been under my care for surgery.      Patient is able to return to work without restrictions as of 21.        Sincerely,            Fanny Jane PA-C

## 2021-12-25 ENCOUNTER — HEALTH MAINTENANCE LETTER (OUTPATIENT)
Age: 56
End: 2021-12-25

## 2022-07-25 ENCOUNTER — OFFICE VISIT (OUTPATIENT)
Dept: URGENT CARE | Facility: URGENT CARE | Age: 57
End: 2022-07-25
Payer: COMMERCIAL

## 2022-07-25 VITALS
DIASTOLIC BLOOD PRESSURE: 80 MMHG | OXYGEN SATURATION: 98 % | TEMPERATURE: 99.2 F | BODY MASS INDEX: 40.61 KG/M2 | SYSTOLIC BLOOD PRESSURE: 138 MMHG | HEART RATE: 68 BPM | WEIGHT: 236.6 LBS | RESPIRATION RATE: 18 BRPM

## 2022-07-25 DIAGNOSIS — J20.9 ACUTE BRONCHITIS, UNSPECIFIED ORGANISM: ICD-10-CM

## 2022-07-25 DIAGNOSIS — J45.21 MILD INTERMITTENT ASTHMA WITH EXACERBATION: Primary | ICD-10-CM

## 2022-07-25 PROCEDURE — 99214 OFFICE O/P EST MOD 30 MIN: CPT | Performed by: PHYSICIAN ASSISTANT

## 2022-07-25 RX ORDER — PREDNISONE 20 MG/1
40 TABLET ORAL DAILY
Qty: 10 TABLET | Refills: 0 | Status: SHIPPED | OUTPATIENT
Start: 2022-07-25 | End: 2022-07-30

## 2022-07-25 RX ORDER — DOXYCYCLINE 100 MG/1
100 CAPSULE ORAL 2 TIMES DAILY
Qty: 14 CAPSULE | Refills: 0 | Status: SHIPPED | OUTPATIENT
Start: 2022-07-25 | End: 2022-08-01

## 2022-07-25 NOTE — PATIENT INSTRUCTIONS
Patient was educated on the natural course of condition. Take medications as directed. Side effects discussed. Conservative measures discussed including rest, increased fluids, humidifier, and over-the-counter cough suppressants. See your primary care provider if symptoms do not improve in 3 days. Seek emergency care if you develop shortness of breath or fever over 103.

## 2022-07-25 NOTE — PROGRESS NOTES
URGENT CARE VISIT:    SUBJECTIVE:   Liss Berg is a 57 year old female presenting with a chief complaint of stuffy nose, cough - productive and chest tightness.  Onset was 3 week(s) ago.   She denies the following symptoms: fever, chills, shortness of breath, vomiting and diarrhea  Course of illness is worsening.    Treatment measures tried include Mucinex with some relief of symptoms.  Predisposing factors include HX of asthma.    PMH:   Past Medical History:   Diagnosis Date     Asthma      Heart murmur      Hypertension      PONV (postoperative nausea and vomiting)      Allergies: Morphine and Penicillins   Medications:   Current Outpatient Medications   Medication Sig Dispense Refill     doxycycline hyclate (VIBRAMYCIN) 100 MG capsule Take 1 capsule (100 mg) by mouth 2 times daily for 7 days 14 capsule 0     predniSONE (DELTASONE) 20 MG tablet Take 2 tablets (40 mg) by mouth daily for 5 days 10 tablet 0     albuterol (PROAIR HFA, PROVENTIL HFA, VENTOLIN HFA) 108 (90 BASE) MCG/ACT inhaler Inhale 2 puffs into the lungs every 6 hours as needed for shortness of breath / dyspnea or wheezing 1 Inhaler 1     albuterol (PROVENTIL) (2.5 MG/3ML) 0.083% neb solution Take 1 vial (2.5 mg) by nebulization every 6 hours as needed for shortness of breath / dyspnea or wheezing 1 Box 0     ferrous sulfate (IRON) 325 (65 FE) MG tablet Take 1 tablet (325 mg) by mouth daily (with breakfast) 90 tablet 0     HYDROcodone-acetaminophen (NORCO) 5-325 MG tablet Take 1-2 tablets by mouth every 4 hours as needed for moderate to severe pain 10 tablet 0     losartan (COZAAR) 100 MG tablet Take 1 tablet (100 mg) by mouth daily 90 tablet 1     metoprolol tartrate (LOPRESSOR) 100 MG tablet Take 1 tablet (100 mg) by mouth 2 times daily 180 tablet 1     order for DME nebulizer 1 Device 0     Social History:   Social History     Tobacco Use     Smoking status: Never Smoker     Smokeless tobacco: Never Used   Substance Use Topics      Alcohol use: Not Currently     Alcohol/week: 0.0 standard drinks     Comment: less that once per month - very rare       ROS:  Review of systems negative except as stated above.    OBJECTIVE:  /80 (BP Location: Right arm, Patient Position: Chair, Cuff Size: Adult Regular)   Pulse 68   Temp 99.2  F (37.3  C) (Oral)   Resp 18   Wt 107.3 kg (236 lb 9.6 oz)   LMP 04/05/2016   SpO2 98%   Breastfeeding No   BMI 40.61 kg/m    GENERAL APPEARANCE: healthy, alert and no distress  EYES: EOMI,  PERRL, conjunctiva clear  HENT: ear canals and TM's normal.  Nose and mouth without ulcers, erythema or lesions  NECK: supple, nontender, no lymphadenopathy  RESP: expiratory wheezes throughout  CV: regular rates and rhythm, normal S1 S2, no murmur noted  SKIN: no suspicious lesions or rashes      ASSESSMENT:    ICD-10-CM    1. Mild intermittent asthma with exacerbation  J45.21 predniSONE (DELTASONE) 20 MG tablet   2. Acute bronchitis, unspecified organism  J20.9 doxycycline hyclate (VIBRAMYCIN) 100 MG capsule       PLAN:  30 minutes spent on the date of the encounter doing chart review, review of outside records, patient visit and documentation.   Patient Instructions   Patient was educated on the natural course of condition. Take medications as directed. Side effects discussed. She has Proair at home and will use every 4 to 6 hours as needed for wheezing. Conservative measures discussed including rest, increased fluids, humidifier, and over-the-counter cough suppressants. See your primary care provider if symptoms do not improve in 3 days. Seek emergency care if you develop shortness of breath or fever over 103.    Patient verbalized understanding and is agreeable to plan. The patient was discharged ambulatory and in stable condition.    Joleen Shafer PA-C ....................  7/25/2022   11:03 AM

## 2022-07-28 ENCOUNTER — HOSPITAL ENCOUNTER (EMERGENCY)
Facility: CLINIC | Age: 57
Discharge: HOME | End: 2022-07-28
Attending: EMERGENCY MEDICINE | Admitting: EMERGENCY MEDICINE
Payer: COMMERCIAL

## 2022-07-28 ENCOUNTER — APPOINTMENT (OUTPATIENT)
Dept: GENERAL RADIOLOGY | Facility: CLINIC | Age: 57
End: 2022-07-28
Attending: EMERGENCY MEDICINE
Payer: COMMERCIAL

## 2022-07-28 VITALS
OXYGEN SATURATION: 99 % | HEART RATE: 71 BPM | TEMPERATURE: 97.9 F | RESPIRATION RATE: 22 BRPM | DIASTOLIC BLOOD PRESSURE: 85 MMHG | SYSTOLIC BLOOD PRESSURE: 180 MMHG

## 2022-07-28 DIAGNOSIS — J45.41 MODERATE PERSISTENT ASTHMA WITH EXACERBATION: ICD-10-CM

## 2022-07-28 DIAGNOSIS — J40 BRONCHITIS: ICD-10-CM

## 2022-07-28 LAB
ANION GAP SERPL CALCULATED.3IONS-SCNC: 12 MMOL/L (ref 7–15)
BASE EXCESS BLDV CALC-SCNC: 2.5 MMOL/L (ref -7.7–1.9)
BASOPHILS # BLD AUTO: 0 10E3/UL (ref 0–0.2)
BASOPHILS NFR BLD AUTO: 0 %
BUN SERPL-MCNC: 16.1 MG/DL (ref 6–20)
CALCIUM SERPL-MCNC: 9.5 MG/DL (ref 8.6–10)
CHLORIDE SERPL-SCNC: 106 MMOL/L (ref 98–107)
CREAT SERPL-MCNC: 0.93 MG/DL (ref 0.51–0.95)
D DIMER PPP FEU-MCNC: 0.45 UG/ML FEU (ref 0–0.5)
DEPRECATED HCO3 PLAS-SCNC: 26 MMOL/L (ref 22–29)
EOSINOPHIL # BLD AUTO: 0 10E3/UL (ref 0–0.7)
EOSINOPHIL NFR BLD AUTO: 0 %
ERYTHROCYTE [DISTWIDTH] IN BLOOD BY AUTOMATED COUNT: 13.2 % (ref 10–15)
FLUAV RNA SPEC QL NAA+PROBE: NEGATIVE
FLUBV RNA RESP QL NAA+PROBE: NEGATIVE
GFR SERPL CREATININE-BSD FRML MDRD: 71 ML/MIN/1.73M2
GLUCOSE SERPL-MCNC: 196 MG/DL (ref 70–99)
HCO3 BLDV-SCNC: 27 MMOL/L (ref 21–28)
HCT VFR BLD AUTO: 40.7 % (ref 35–47)
HGB BLD-MCNC: 13.4 G/DL (ref 11.7–15.7)
HOLD SPECIMEN: NORMAL
IMM GRANULOCYTES # BLD: 0.1 10E3/UL
IMM GRANULOCYTES NFR BLD: 1 %
LYMPHOCYTES # BLD AUTO: 0.8 10E3/UL (ref 0.8–5.3)
LYMPHOCYTES NFR BLD AUTO: 12 %
MCH RBC QN AUTO: 29 PG (ref 26.5–33)
MCHC RBC AUTO-ENTMCNC: 32.9 G/DL (ref 31.5–36.5)
MCV RBC AUTO: 88 FL (ref 78–100)
MONOCYTES # BLD AUTO: 0.2 10E3/UL (ref 0–1.3)
MONOCYTES NFR BLD AUTO: 3 %
NEUTROPHILS # BLD AUTO: 5.9 10E3/UL (ref 1.6–8.3)
NEUTROPHILS NFR BLD AUTO: 84 %
NRBC # BLD AUTO: 0 10E3/UL
NRBC BLD AUTO-RTO: 0 /100
NT-PROBNP SERPL-MCNC: 614 PG/ML (ref 0–900)
O2/TOTAL GAS SETTING VFR VENT: 0 %
OXYHGB MFR BLDV: 81 % (ref 70–75)
PCO2 BLDV: 41 MM HG (ref 40–50)
PH BLDV: 7.43 [PH] (ref 7.32–7.43)
PLATELET # BLD AUTO: 266 10E3/UL (ref 150–450)
PO2 BLDV: 45 MM HG (ref 25–47)
POTASSIUM SERPL-SCNC: 3.8 MMOL/L (ref 3.4–5.3)
PROCALCITONIN SERPL IA-MCNC: 0.03 NG/ML
RBC # BLD AUTO: 4.62 10E6/UL (ref 3.8–5.2)
RSV RNA SPEC NAA+PROBE: NEGATIVE
SARS-COV-2 RNA RESP QL NAA+PROBE: NEGATIVE
SODIUM SERPL-SCNC: 144 MMOL/L (ref 136–145)
WBC # BLD AUTO: 7 10E3/UL (ref 4–11)

## 2022-07-28 PROCEDURE — 85379 FIBRIN DEGRADATION QUANT: CPT | Performed by: EMERGENCY MEDICINE

## 2022-07-28 PROCEDURE — 250N000009 HC RX 250: Performed by: EMERGENCY MEDICINE

## 2022-07-28 PROCEDURE — 36415 COLL VENOUS BLD VENIPUNCTURE: CPT | Performed by: EMERGENCY MEDICINE

## 2022-07-28 PROCEDURE — 84145 PROCALCITONIN (PCT): CPT | Performed by: EMERGENCY MEDICINE

## 2022-07-28 PROCEDURE — 96365 THER/PROPH/DIAG IV INF INIT: CPT

## 2022-07-28 PROCEDURE — 96375 TX/PRO/DX INJ NEW DRUG ADDON: CPT

## 2022-07-28 PROCEDURE — 93005 ELECTROCARDIOGRAM TRACING: CPT

## 2022-07-28 PROCEDURE — C9803 HOPD COVID-19 SPEC COLLECT: HCPCS

## 2022-07-28 PROCEDURE — 82374 ASSAY BLOOD CARBON DIOXIDE: CPT | Performed by: EMERGENCY MEDICINE

## 2022-07-28 PROCEDURE — 94640 AIRWAY INHALATION TREATMENT: CPT

## 2022-07-28 PROCEDURE — 82805 BLOOD GASES W/O2 SATURATION: CPT | Performed by: EMERGENCY MEDICINE

## 2022-07-28 PROCEDURE — 82310 ASSAY OF CALCIUM: CPT | Performed by: EMERGENCY MEDICINE

## 2022-07-28 PROCEDURE — 250N000011 HC RX IP 250 OP 636: Performed by: EMERGENCY MEDICINE

## 2022-07-28 PROCEDURE — 87637 SARSCOV2&INF A&B&RSV AMP PRB: CPT | Performed by: EMERGENCY MEDICINE

## 2022-07-28 PROCEDURE — 99285 EMERGENCY DEPT VISIT HI MDM: CPT | Mod: CS,25

## 2022-07-28 PROCEDURE — 85025 COMPLETE CBC W/AUTO DIFF WBC: CPT | Performed by: EMERGENCY MEDICINE

## 2022-07-28 PROCEDURE — 83880 ASSAY OF NATRIURETIC PEPTIDE: CPT | Performed by: EMERGENCY MEDICINE

## 2022-07-28 PROCEDURE — 71046 X-RAY EXAM CHEST 2 VIEWS: CPT

## 2022-07-28 RX ORDER — MAGNESIUM SULFATE HEPTAHYDRATE 40 MG/ML
2 INJECTION, SOLUTION INTRAVENOUS ONCE
Status: COMPLETED | OUTPATIENT
Start: 2022-07-28 | End: 2022-07-28

## 2022-07-28 RX ORDER — IPRATROPIUM BROMIDE AND ALBUTEROL SULFATE 2.5; .5 MG/3ML; MG/3ML
3 SOLUTION RESPIRATORY (INHALATION)
Status: COMPLETED | OUTPATIENT
Start: 2022-07-28 | End: 2022-07-28

## 2022-07-28 RX ORDER — METHYLPREDNISOLONE SODIUM SUCCINATE 125 MG/2ML
125 INJECTION, POWDER, LYOPHILIZED, FOR SOLUTION INTRAMUSCULAR; INTRAVENOUS ONCE
Status: COMPLETED | OUTPATIENT
Start: 2022-07-28 | End: 2022-07-28

## 2022-07-28 RX ADMIN — MAGNESIUM SULFATE HEPTAHYDRATE 2 G: 40 INJECTION, SOLUTION INTRAVENOUS at 16:53

## 2022-07-28 RX ADMIN — IPRATROPIUM BROMIDE AND ALBUTEROL SULFATE 3 ML: .5; 3 SOLUTION RESPIRATORY (INHALATION) at 17:02

## 2022-07-28 RX ADMIN — IPRATROPIUM BROMIDE AND ALBUTEROL SULFATE 3 ML: .5; 3 SOLUTION RESPIRATORY (INHALATION) at 17:03

## 2022-07-28 RX ADMIN — METHYLPREDNISOLONE SODIUM SUCCINATE 125 MG: 125 INJECTION, POWDER, FOR SOLUTION INTRAMUSCULAR; INTRAVENOUS at 17:02

## 2022-07-28 RX ADMIN — IPRATROPIUM BROMIDE AND ALBUTEROL SULFATE 3 ML: .5; 3 SOLUTION RESPIRATORY (INHALATION) at 17:01

## 2022-07-28 ASSESSMENT — ENCOUNTER SYMPTOMS
CHEST TIGHTNESS: 1
WHEEZING: 1
COUGH: 1
SHORTNESS OF BREATH: 1

## 2022-07-28 NOTE — ED PROVIDER NOTES
"  History   Chief Complaint:  Cough and Chest Pain    The history is provided by the patient.      Liss Berg is a 57 year old female with history of asthma, obesity, and hypertension, who presents with progressively worsening cough over the last four weeks. For the last month the patient has been experiencing an ongoing cough, with dyspnea, wheezing, and chest heaviness. She has received 2 negative COVID-19 tests at home and has been treating with her at-home inhaler as needed (history of asthma) with newly prescribed prednisone and doxycycline 4 days ago. For the past few days, she reports worsening productive cough and other symptoms with accompanying chest pain (no chest pain in the absence of cough). She has not been taking any decongestant at home to manage her symptoms and reports particular exacerbation when laying supine. Worsening overall symptoms into today prompted her concern and presentation to the ED at this time. The patient is otherwise well and denies recent leg swelling or other. 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 She denies recent travel, tobacco use, or known allergies.    Review of Systems   HENT: Positive for congestion.    Respiratory: Positive for cough, chest tightness (heaviness), shortness of breath and wheezing.    Cardiovascular: Positive for chest pain (\"heaviness\", only when coughing). Negative for leg swelling.   All other systems reviewed and are negative.    Allergies:  Morphine  Penicillins    Medications:  Albuterol  Ferrous sulfate  Losartan  Metoprolol tartrate  Prednisone    Past Medical History:     Colorectal carcinoma  Benign essential hypertension  Mild intermittent asthma  Hypothyroidism  Depressive disorder  Chronic left shoulder pain  Asthma  Sepsis with acute organ dysfunction  Colon cancer  Gestational diabetes  Miscarriage  Pneumonia  Transient thrombocytopenia  Anemia  Morbid " obesity  Hypertrophic cardiomyopathy    Past Surgical History:    Appendectomy  Colectomy right  I&D trunk, combined, left  Insert port vascular access, left  Fallopian tube ligation  Excision pilonidal cyst  Laparoscopic assisted colectomy, right  Orthopedic surgery  Remove port vascular access    Family History:    Lung disease  Hypertension  Depression  COPD  Parathyroid problem    Social History:  The patient presents to the ED alone.  The patient arrived in a private vehicle.  PCP: Alina Mahmood MD, Internal Medicine    Physical Exam     Patient Vitals for the past 24 hrs:   BP Temp Temp src Pulse Resp SpO2   07/28/22 1920 (!) 180/85 -- -- 71 -- 99 %   07/28/22 1730 (!) 182/81 -- -- 75 -- 95 %   07/28/22 1715 (!) 183/88 -- -- 76 -- 94 %   07/28/22 1630 (!) 169/80 -- -- 73 -- 99 %   07/28/22 1615 (!) 188/92 -- -- -- -- --   07/28/22 1531 (!) 202/102 97.9  F (36.6  C) Temporal 72 22 95 %     Physical Exam  Constitutional: Alert, attentive, GCS 15   HENT:    Nose: Nose normal.    Mouth/Throat: Oropharynx is clear, mucous membranes are moist  Eyes: EOM are normal, anicteric, conjugate gaze  CV: regular rate and rhythm; no murmurs  Chest: Effort normal and breath sounds with symmetric bilateral diffuse expiratory wheezing, intermittent central rhonchi  GI:  non tender. No distension. No guarding or rebound.    MSK: No LE edema, no tenderness to palpation of BLE.  Neurological: Alert, attentive, moving all extremities equally.   Skin: Skin is warm and dry.    Emergency Department Course   ECG  ECG (15:25:45):  Indication: Screening for cardiovascular disease.   Rate 63 bpm. ID interval 154. QRS duration 102. QT/QTc 442.  Interpretation: Sinus rhythm with nonspecific ST segment depressions, U wave.  Agree with computer interpretation.   No significant change compared to EKG dated 11/2021.   Interpreted at 1525 by Dr Kulkarni.     Imaging:  Chest XR,  PA & LAT   Final Result   IMPRESSION: No focal airspace  opacities, pleural effusions, or pneumothorax. A 1 cm nodular opacity in the left midlung is unchanged since 2020, and a more medial smaller nodular opacity is unchanged and corresponds to a known calcified pulmonary nodule.    Borderline enlarged cardiac silhouette is unchanged.        Report per radiology    Laboratory:  Labs Ordered and Resulted from Time of ED Arrival to Time of ED Departure   BLOOD GAS VENOUS WITH OXYHEMOGLOBIN - Abnormal       Result Value    pH Venous 7.43      pCO2 Venous 41      pO2 Venous 45      Bicarbonate Venous 27      FIO2 0      Oxyhemoglobin Venous 81 (*)     Base Excess/Deficit (+/-) 2.5 (*)    BASIC METABOLIC PANEL - Abnormal    Creatinine 0.93      Sodium 144      Potassium 3.8      Urea Nitrogen 16.1      Chloride 106      Carbon Dioxide (CO2) 26      Anion Gap 12      Glucose 196 (*)     GFR Estimate 71      Calcium 9.5     INFLUENZA A/B & SARS-COV2 PCR MULTIPLEX - Normal    Influenza A PCR Negative      Influenza B PCR Negative      RSV PCR Negative      SARS CoV2 PCR Negative     D DIMER QUANTITATIVE - Normal    D-Dimer Quantitative 0.45     NT PROBNP INPATIENT - Normal    N terminal Pro BNP Inpatient 614     PROCALCITONIN - Normal    Procalcitonin 0.03     CBC WITH PLATELETS AND DIFFERENTIAL    WBC Count 7.0      RBC Count 4.62      Hemoglobin 13.4      Hematocrit 40.7      MCV 88      MCH 29.0      MCHC 32.9      RDW 13.2      Platelet Count 266      % Neutrophils 84      % Lymphocytes 12      % Monocytes 3      % Eosinophils 0      % Basophils 0      % Immature Granulocytes 1      NRBCs per 100 WBC 0      Absolute Neutrophils 5.9      Absolute Lymphocytes 0.8      Absolute Monocytes 0.2      Absolute Eosinophils 0.0      Absolute Basophils 0.0      Absolute Immature Granulocytes 0.1      Absolute NRBCs 0.0        Emergency Department Course:      Reviewed:  1635 I reviewed nursing notes, vitals, past medical history and Care Everywhere.  1823 I rechecked the patient and  explained findings. I believe that they are safe for discharge at this time.    Interventions:  1653 Magnesium sulfate 2 g IV  1701 DuoNeb 3 mL Neb  1702 Methylprednisolone 125 mg IV  1702 DuoNeb 3 mL Neb  1703 DuoNeb 3 mL Neb    Disposition:  The patient was discharged to home.     Impression & Plan     Medical Decision Makin-year-old woman past medical history significant for asthma presenting for 1 month of cough, mild shortness of breath currently on doxycycline and prednisone for 4 days without improvement in her symptoms.  She denies any fevers or chills.  On exam she does have diffusely expiratory wheezing diffusely with some mild central rhonchi.  Cardiac evaluation was negative including normal EKG and negative BNP.  D-dimer is negative otherwise ruling out PE low risk patient.  Chest x-ray is clear, she is not hypoxic here and I suspect likely bronchitis, with asthma exacerbation question secondary to postviral syndrome versus allergies.  I recommended continuation of her steroids and doxycycline, continuing to use her albuterol inhalers every 4 hours as well as Afrin and Flonase.  Return precautions were reviewed, she was discharged home with recommended PCP follow-up.  She was ambulated in emergency department prior to discharge with sats maintaining above 96%.    Diagnosis:    ICD-10-CM    1. Bronchitis  J40    2. Moderate persistent asthma with exacerbation  J45.41      Adarsh Kulkarni MD  [employer]  6:40 PM 22     Scribe Disclosure:  IGail, am serving as a scribe at 4:31 PM on 2022 to document services personally performed by Adarsh Kulkarni MD  based on my observations and the provider's statements to me.    I, Meryl Tyler, am serving as a scribe at 4:31 PM on 2022 to document services personally performed by Adarsh Kulkarni MD based on my observations and the provider's statements to me.       Adarsh Kulkarni,  MD  07/28/22 9617

## 2022-07-28 NOTE — LETTER
[facility] EMERGENCY DEPT  [address]  [place] [place] [address]  [phone]    Liss Berg  [address]  [facility] [place] [address]  [phone] (home) [phone] (work)    : 1965      To Whom it may concern:    Liss Berg was seen in our Emergency Department today, 2022 for an evaluation.  Please excuse her from work through  to recover.      Sincerely,  ____________________    Adarsh Kulkarni MD

## 2022-07-28 NOTE — DISCHARGE INSTRUCTIONS
You should continue to finish your steroids and your antibiotics.  You should use your inhalers every 4 hours to help with your symptoms.  I recommend Afrin and Flonase to help clear your nose out.  Should you develop worsening breathing, severe chest pain or passout, you should return to the emergency department as you should make appointment follow-up with your primary doctor for recheck.

## 2022-07-28 NOTE — ED TRIAGE NOTES
Cough x 4 weeks. 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 Seen at  on Monday and given doxycycline and prednisone w/ no improvement in symptoms.      Triage Assessment     Row Name 07/28/22 1536       Triage Assessment (Adult)    Airway WDL WDL       Respiratory WDL    Respiratory WDL X;cough    Cough Frequency frequent    Cough Type productive       Skin Circulation/Temperature WDL    Skin Circulation/Temperature WDL WDL       Cardiac WDL    Cardiac WDL WDL       Peripheral/Neurovascular WDL    Peripheral Neurovascular WDL WDL       Cognitive/Neuro/Behavioral WDL    Cognitive/Neuro/Behavioral WDL WDL       Reardan Coma Scale    Best Eye Response 4-->(E4) spontaneous    Best Motor Response 6-->(M6) obeys commands    Best Verbal Response 5-->(V5) oriented    Raudel Coma Scale Score 15

## 2022-08-01 ENCOUNTER — E-VISIT (OUTPATIENT)
Dept: URGENT CARE | Facility: CLINIC | Age: 57
End: 2022-08-01
Payer: COMMERCIAL

## 2022-08-01 DIAGNOSIS — R05.9 COUGH: Primary | ICD-10-CM

## 2022-08-01 PROCEDURE — 99207 PR NON-BILLABLE SERV PER CHARTING: CPT | Performed by: EMERGENCY MEDICINE

## 2022-08-01 NOTE — PATIENT INSTRUCTIONS
Dear Liss Berg,    It is important that you be seen and examined to understand what is causing your symptoms especially since you are short of breath.  If you cannot get into see your PCP please come into urgent care.        We are sorry you are not feeling well. Based on the responses you provided, it is recommended that you be seen in-person in urgent care so we can better evaluate your symptoms. Please click here to find the nearest urgent care location to you.   You will not be charged for this Visit. Thank you for trusting us with your care.    Mohsen Ortega MD

## 2022-10-16 ENCOUNTER — HEALTH MAINTENANCE LETTER (OUTPATIENT)
Age: 57
End: 2022-10-16

## 2023-04-01 ENCOUNTER — HEALTH MAINTENANCE LETTER (OUTPATIENT)
Age: 58
End: 2023-04-01

## 2024-01-13 ENCOUNTER — HEALTH MAINTENANCE LETTER (OUTPATIENT)
Age: 59
End: 2024-01-13

## 2024-06-01 ENCOUNTER — HEALTH MAINTENANCE LETTER (OUTPATIENT)
Age: 59
End: 2024-06-01

## 2025-06-14 ENCOUNTER — HEALTH MAINTENANCE LETTER (OUTPATIENT)
Age: 60
End: 2025-06-14

## (undated) DEVICE — BAG CLEAR TRASH 1.3M 39X33" P4040C

## (undated) DEVICE — GLOVE PROTEXIS W/NEU-THERA 7.0  2D73TE70

## (undated) DEVICE — LINEN TOWEL PACK X10 5473

## (undated) DEVICE — ESU GROUND PAD ADULT W/CORD E7507

## (undated) DEVICE — PREP SKIN SCRUB TRAY 4461A

## (undated) DEVICE — LINEN HALF SHEET 5512

## (undated) DEVICE — DRAPE LAP W/ARMBOARD 29410

## (undated) DEVICE — PACK MINOR CUSTOM RIDGES SBA32RMRMA

## (undated) DEVICE — PREP POVIDONE IODINE SOLUTION 10% 4OZ BOTTLE 29906-004

## (undated) DEVICE — GLOVE PROTEXIS BLUE W/NEU-THERA 8.0  2D73EB80

## (undated) DEVICE — SU VICRYL 3-0 TIE 12X18" J904T

## (undated) DEVICE — GLOVE PROTEXIS POWDER FREE 7.5 ORTHOPEDIC 2D73ET75

## (undated) DEVICE — DECANTER VIAL 2006S

## (undated) DEVICE — SU VICRYL 4-0 P-3 18" UND J494G

## (undated) DEVICE — SOL ADH LIQUID BENZOIN SWAB 0.6ML C1544

## (undated) DEVICE — SYR 20ML LL W/O NDL 302830

## (undated) DEVICE — SYR 03ML LL W/O NDL 309657

## (undated) DEVICE — NDL 22GA 1.5"

## (undated) DEVICE — GLOVE PROTEXIS W/NEU-THERA 7.5  2D73TE75

## (undated) DEVICE — PREP SCRUB SOL EXIDINE 4% CHG 4OZ 29002-404

## (undated) DEVICE — SOL NACL 0.9% IRRIG 1000ML BOTTLE 2F7124

## (undated) DEVICE — SU VICRYL 3-0 RB-1 27" J305H

## (undated) DEVICE — GLOVE PROTEXIS BLUE W/NEU-THERA 7.5  2D73EB75

## (undated) DEVICE — DRSG STERI STRIP 1/2X4" R1547

## (undated) DEVICE — DRAPE LAP PEDS DISP 29492

## (undated) DEVICE — LINEN FULL SHEET 5511

## (undated) DEVICE — NDL BLUNT 18GA 1" W/O FILTER 305181

## (undated) DEVICE — PREP POVIDONE-IODINE 7.5% SCRUB 4OZ BOTTLE MDS093945

## (undated) RX ORDER — LIDOCAINE HYDROCHLORIDE 10 MG/ML
INJECTION, SOLUTION EPIDURAL; INFILTRATION; INTRACAUDAL; PERINEURAL
Status: DISPENSED
Start: 2021-12-01

## (undated) RX ORDER — LIDOCAINE HYDROCHLORIDE 10 MG/ML
INJECTION, SOLUTION EPIDURAL; INFILTRATION; INTRACAUDAL; PERINEURAL
Status: DISPENSED
Start: 2018-07-05

## (undated) RX ORDER — LIDOCAINE HYDROCHLORIDE AND EPINEPHRINE 5; 5 MG/ML; UG/ML
INJECTION, SOLUTION INFILTRATION; PERINEURAL
Status: DISPENSED
Start: 2018-07-05

## (undated) RX ORDER — BUPIVACAINE HYDROCHLORIDE 5 MG/ML
INJECTION, SOLUTION EPIDURAL; INTRACAUDAL
Status: DISPENSED
Start: 2021-12-01

## (undated) RX ORDER — PROPOFOL 10 MG/ML
INJECTION, EMULSION INTRAVENOUS
Status: DISPENSED
Start: 2018-07-05

## (undated) RX ORDER — HYDRALAZINE HYDROCHLORIDE 20 MG/ML
INJECTION INTRAMUSCULAR; INTRAVENOUS
Status: DISPENSED
Start: 2021-12-01

## (undated) RX ORDER — CLINDAMYCIN PHOSPHATE 600 MG/50ML
INJECTION, SOLUTION INTRAVENOUS
Status: DISPENSED
Start: 2018-07-05